# Patient Record
Sex: FEMALE | Race: WHITE | Employment: OTHER | URBAN - METROPOLITAN AREA
[De-identification: names, ages, dates, MRNs, and addresses within clinical notes are randomized per-mention and may not be internally consistent; named-entity substitution may affect disease eponyms.]

---

## 2019-10-15 ENCOUNTER — HOSPITAL ENCOUNTER (OUTPATIENT)
Age: 59
Setting detail: SPECIMEN
Discharge: HOME | End: 2019-10-15

## 2019-10-15 LAB
ABSOLUTE EOS #: 0.14 K/UL (ref 0–0.44)
ABSOLUTE IMMATURE GRANULOCYTE: <0.03 K/UL (ref 0–0.3)
ABSOLUTE LYMPH #: 1.37 K/UL (ref 1.1–3.7)
ABSOLUTE MONO #: 0.43 K/UL (ref 0.1–1.2)
BASOPHILS # BLD: 1 % (ref 0–2)
BASOPHILS ABSOLUTE: 0.03 K/UL (ref 0–0.2)
DIFFERENTIAL TYPE: ABNORMAL
EOSINOPHILS RELATIVE PERCENT: 2 % (ref 1–4)
HCT VFR BLD CALC: 45.3 % (ref 36.3–47.1)
HEMOGLOBIN: 14.6 G/DL (ref 11.9–15.1)
IMMATURE GRANULOCYTES: 0 %
LYMPHOCYTES # BLD: 22 % (ref 24–43)
MCH RBC QN AUTO: 30 PG (ref 25.2–33.5)
MCHC RBC AUTO-ENTMCNC: 32.2 G/DL (ref 28.4–34.8)
MCV RBC AUTO: 93 FL (ref 82.6–102.9)
MONOCYTES # BLD: 7 % (ref 3–12)
NRBC AUTOMATED: 0 PER 100 WBC
PDW BLD-RTO: 12.3 % (ref 11.8–14.4)
PLATELET # BLD: 262 K/UL (ref 138–453)
PLATELET ESTIMATE: ABNORMAL
PMV BLD AUTO: 12 FL (ref 8.1–13.5)
RBC # BLD: 4.87 M/UL (ref 3.95–5.11)
RBC # BLD: ABNORMAL 10*6/UL
SEG NEUTROPHILS: 68 % (ref 36–65)
SEGMENTED NEUTROPHILS ABSOLUTE COUNT: 4.22 K/UL (ref 1.5–8.1)
WBC # BLD: 6.2 K/UL (ref 3.5–11.3)
WBC # BLD: ABNORMAL 10*3/UL

## 2019-10-16 LAB
ALBUMIN SERPL-MCNC: 4.6 G/DL (ref 3.5–5.2)
ALBUMIN/GLOBULIN RATIO: 1.6 (ref 1–2.5)
ALP BLD-CCNC: 104 U/L (ref 35–104)
ALT SERPL-CCNC: 27 U/L (ref 5–33)
ANION GAP SERPL CALCULATED.3IONS-SCNC: 21 MMOL/L (ref 9–17)
AST SERPL-CCNC: 32 U/L
BILIRUB SERPL-MCNC: 0.47 MG/DL (ref 0.3–1.2)
BUN BLDV-MCNC: 13 MG/DL (ref 8–23)
BUN/CREAT BLD: ABNORMAL (ref 9–20)
CALCIUM SERPL-MCNC: 10 MG/DL (ref 8.6–10.4)
CHLORIDE BLD-SCNC: 101 MMOL/L (ref 98–107)
CHOLESTEROL/HDL RATIO: 2.8
CHOLESTEROL: 158 MG/DL
CO2: 23 MMOL/L (ref 20–31)
CREAT SERPL-MCNC: 0.92 MG/DL (ref 0.5–0.9)
GFR AFRICAN AMERICAN: >60 ML/MIN
GFR NON-AFRICAN AMERICAN: >60 ML/MIN
GFR SERPL CREATININE-BSD FRML MDRD: ABNORMAL ML/MIN/{1.73_M2}
GFR SERPL CREATININE-BSD FRML MDRD: ABNORMAL ML/MIN/{1.73_M2}
GLUCOSE BLD-MCNC: 103 MG/DL (ref 70–99)
HDLC SERPL-MCNC: 56 MG/DL
LDL CHOLESTEROL: 85 MG/DL (ref 0–130)
POTASSIUM SERPL-SCNC: 4.2 MMOL/L (ref 3.7–5.3)
SODIUM BLD-SCNC: 145 MMOL/L (ref 135–144)
TOTAL PROTEIN: 7.5 G/DL (ref 6.4–8.3)
TRIGL SERPL-MCNC: 86 MG/DL
TSH SERPL DL<=0.05 MIU/L-ACNC: 2.58 MIU/L (ref 0.3–5)
VLDLC SERPL CALC-MCNC: NORMAL MG/DL (ref 1–30)

## 2019-10-18 LAB
HPV SAMPLE: NORMAL
HPV, GENOTYPE 16: NOT DETECTED
HPV, GENOTYPE 18: NOT DETECTED
HPV, HIGH RISK OTHER: NOT DETECTED
HPV, INTERPRETATION: NORMAL
SPECIMEN DESCRIPTION: NORMAL

## 2019-10-21 LAB — CYTOLOGY REPORT: NORMAL

## 2019-12-10 ENCOUNTER — HOSPITAL ENCOUNTER (OUTPATIENT)
Dept: WOMENS IMAGING | Age: 59
Discharge: HOME | End: 2019-12-12
Payer: COMMERCIAL

## 2019-12-10 DIAGNOSIS — Z12.31 BREAST CANCER SCREENING BY MAMMOGRAM: ICD-10-CM

## 2019-12-10 PROCEDURE — 77067 SCR MAMMO BI INCL CAD: CPT

## 2020-02-10 ENCOUNTER — HOSPITAL ENCOUNTER (OUTPATIENT)
Dept: ULTRASOUND IMAGING | Age: 60
Discharge: HOME | End: 2020-02-12

## 2020-02-10 PROCEDURE — 76536 US EXAM OF HEAD AND NECK: CPT

## 2020-02-20 ENCOUNTER — HOSPITAL ENCOUNTER (OUTPATIENT)
Dept: CT IMAGING | Age: 60
Discharge: HOME | End: 2020-02-22

## 2020-02-20 ENCOUNTER — HOSPITAL ENCOUNTER (OUTPATIENT)
Dept: LAB | Age: 60
Discharge: HOME | End: 2020-02-20

## 2020-02-20 LAB
ABSOLUTE EOS #: 0.19 K/UL (ref 0–0.44)
ABSOLUTE IMMATURE GRANULOCYTE: <0.03 K/UL (ref 0–0.3)
ABSOLUTE LYMPH #: 1.55 K/UL (ref 1.1–3.7)
ABSOLUTE MONO #: 0.38 K/UL (ref 0.1–1.2)
ALBUMIN SERPL-MCNC: 4.2 G/DL (ref 3.5–5.2)
ALBUMIN/GLOBULIN RATIO: 1.6 (ref 1–2.5)
ALP BLD-CCNC: 96 U/L (ref 35–104)
ALT SERPL-CCNC: 22 U/L (ref 5–33)
ANION GAP SERPL CALCULATED.3IONS-SCNC: 9 MMOL/L (ref 9–17)
AST SERPL-CCNC: 24 U/L
BASOPHILS # BLD: 1 % (ref 0–2)
BASOPHILS ABSOLUTE: 0.03 K/UL (ref 0–0.2)
BILIRUB SERPL-MCNC: 0.18 MG/DL (ref 0.3–1.2)
BUN BLDV-MCNC: 14 MG/DL (ref 8–23)
BUN/CREAT BLD: 17 (ref 9–20)
CALCIUM SERPL-MCNC: 9.5 MG/DL (ref 8.6–10.4)
CHLORIDE BLD-SCNC: 95 MMOL/L (ref 98–107)
CO2: 29 MMOL/L (ref 20–31)
CREAT SERPL-MCNC: 0.83 MG/DL (ref 0.5–0.9)
DIFFERENTIAL TYPE: NORMAL
EOSINOPHILS RELATIVE PERCENT: 3 % (ref 1–4)
GFR AFRICAN AMERICAN: >60 ML/MIN
GFR NON-AFRICAN AMERICAN: >60 ML/MIN
GFR SERPL CREATININE-BSD FRML MDRD: ABNORMAL ML/MIN/{1.73_M2}
GFR SERPL CREATININE-BSD FRML MDRD: ABNORMAL ML/MIN/{1.73_M2}
GLUCOSE BLD-MCNC: 105 MG/DL (ref 70–99)
HCT VFR BLD CALC: 43.8 % (ref 36.3–47.1)
HEMOGLOBIN: 13.8 G/DL (ref 11.9–15.1)
IMMATURE GRANULOCYTES: 0 %
LYMPHOCYTES # BLD: 28 % (ref 24–43)
MCH RBC QN AUTO: 29.5 PG (ref 25.2–33.5)
MCHC RBC AUTO-ENTMCNC: 31.5 G/DL (ref 28.4–34.8)
MCV RBC AUTO: 93.6 FL (ref 82.6–102.9)
MONOCYTES # BLD: 7 % (ref 3–12)
NRBC AUTOMATED: 0 PER 100 WBC
PDW BLD-RTO: 11.9 % (ref 11.8–14.4)
PLATELET # BLD: 243 K/UL (ref 138–453)
PLATELET ESTIMATE: NORMAL
PMV BLD AUTO: 11.3 FL (ref 8.1–13.5)
POTASSIUM SERPL-SCNC: 4.3 MMOL/L (ref 3.7–5.3)
RBC # BLD: 4.68 M/UL (ref 3.95–5.11)
RBC # BLD: NORMAL 10*6/UL
SEG NEUTROPHILS: 61 % (ref 36–65)
SEGMENTED NEUTROPHILS ABSOLUTE COUNT: 3.45 K/UL (ref 1.5–8.1)
SODIUM BLD-SCNC: 133 MMOL/L (ref 135–144)
TOTAL PROTEIN: 6.8 G/DL (ref 6.4–8.3)
TSH SERPL DL<=0.05 MIU/L-ACNC: 2.06 MIU/L (ref 0.3–5)
WBC # BLD: 5.6 K/UL (ref 3.5–11.3)
WBC # BLD: NORMAL 10*3/UL

## 2020-02-20 PROCEDURE — 36415 COLL VENOUS BLD VENIPUNCTURE: CPT

## 2020-02-20 PROCEDURE — 84443 ASSAY THYROID STIM HORMONE: CPT

## 2020-02-20 PROCEDURE — 85025 COMPLETE CBC W/AUTO DIFF WBC: CPT

## 2020-02-20 PROCEDURE — 70491 CT SOFT TISSUE NECK W/DYE: CPT

## 2020-02-20 PROCEDURE — 6360000004 HC RX CONTRAST MEDICATION: Performed by: NURSE PRACTITIONER

## 2020-02-20 PROCEDURE — 80053 COMPREHEN METABOLIC PANEL: CPT

## 2020-02-20 RX ADMIN — IOPAMIDOL 75 ML: 755 INJECTION, SOLUTION INTRAVENOUS at 11:16

## 2020-03-09 ENCOUNTER — HOSPITAL ENCOUNTER (OUTPATIENT)
Dept: CT IMAGING | Age: 60
Discharge: HOME | End: 2020-03-11

## 2020-03-09 PROCEDURE — 71275 CT ANGIOGRAPHY CHEST: CPT

## 2020-03-09 PROCEDURE — 6360000004 HC RX CONTRAST MEDICATION: Performed by: NURSE PRACTITIONER

## 2020-03-09 RX ADMIN — IOPAMIDOL 75 ML: 755 INJECTION, SOLUTION INTRAVENOUS at 14:21

## 2020-08-27 ENCOUNTER — OFFICE VISIT (OUTPATIENT)
Dept: ONCOLOGY | Age: 60
End: 2020-08-27

## 2020-08-27 VITALS
DIASTOLIC BLOOD PRESSURE: 73 MMHG | TEMPERATURE: 98.5 F | SYSTOLIC BLOOD PRESSURE: 120 MMHG | BODY MASS INDEX: 36.02 KG/M2 | RESPIRATION RATE: 18 BRPM | HEART RATE: 82 BPM | WEIGHT: 211 LBS | HEIGHT: 64 IN

## 2020-08-27 PROBLEM — D49.0 PAROTID TUMOR: Status: ACTIVE | Noted: 2020-08-27

## 2020-08-27 PROBLEM — R91.8 LUNG MASS: Status: ACTIVE | Noted: 2020-08-27

## 2020-08-27 PROCEDURE — 99245 OFF/OP CONSLTJ NEW/EST HI 55: CPT | Performed by: INTERNAL MEDICINE

## 2020-08-27 RX ORDER — METOPROLOL TARTRATE 50 MG/1
50 TABLET, FILM COATED ORAL 2 TIMES DAILY
COMMUNITY

## 2020-08-27 RX ORDER — ELECTROLYTES/DEXTROSE
SOLUTION, ORAL ORAL
COMMUNITY
Start: 2019-07-11

## 2020-08-27 RX ORDER — ANTIOX #8/OM3/DHA/EPA/LUT/ZEAX 250-2.5 MG
CAPSULE ORAL
COMMUNITY
Start: 2019-07-11 | End: 2020-08-27 | Stop reason: SDUPTHER

## 2020-08-27 RX ORDER — NYSTATIN 100000 U/G
OINTMENT TOPICAL
COMMUNITY
Start: 2020-08-13

## 2020-08-27 RX ORDER — ANTIOX #8/OM3/DHA/EPA/LUT/ZEAX 250-2.5 MG
CAPSULE ORAL
COMMUNITY
Start: 2019-07-11

## 2020-08-27 RX ORDER — LISINOPRIL AND HYDROCHLOROTHIAZIDE 12.5; 1 MG/1; MG/1
1 TABLET ORAL DAILY
COMMUNITY

## 2020-08-27 RX ORDER — BUPROPION HYDROCHLORIDE 150 MG/1
150 TABLET, EXTENDED RELEASE ORAL 3 TIMES DAILY
COMMUNITY
Start: 2019-07-11

## 2020-08-27 RX ORDER — NAPROXEN 500 MG/1
TABLET ORAL
COMMUNITY
Start: 2019-07-11

## 2020-08-27 RX ORDER — SIMVASTATIN 20 MG
20 TABLET ORAL NIGHTLY
COMMUNITY

## 2020-08-27 NOTE — PROGRESS NOTES
_               Ms. Ericka Ruiz is a very pleasant 63 y.o. female with history of multiple co morbidities as listed. The patient is referred for evaluation of right upper lobe lung mass and parotid gland tumor. Patient had feeling of enlarged bilateral submandibular areas for 1 to 2 years. She started noticing some painful areas about 8 months ago. For evaluation of the submandibular symptoms patient had soft tissue neck CT scan. Mirna Staggers She was noted to have slightly enlarged parotids but also incidentally discovered right upper lobe oval mass. CT scan of the chest March 9 showed same findings. Patient was referred for further evaluation however due to coronavirus pandemic she was not seen until today. Patient has no chest pain. No cough, sputum or hemoptysis. No weight loss or decreased appetite. No fever or night sweats. No enlarged lymph nodes. Patient smokes half pack per day for the last 40 years. Denies alcohol drinking. PAST MEDICAL HISTORY: has a past medical history of ADHD (attention deficit hyperactivity disorder), Anxiety, Back pain, Chronic dental pain, and Hypertension. PAST SURGICAL HISTORY: has a past surgical history that includes Tonsillectomy; Toe Surgery; Dental surgery; Tubal ligation; Rotator cuff repair; and Breast lumpectomy. CURRENT MEDICATIONS:  has a current medication list which includes the following prescription(s): medical marijuana, naproxen, multivitamin adult, preservision areds 2, bupropion, nystatin, simvastatin, metoprolol tartrate, lisinopril-hydrochlorothiazide, and cetirizine hcl. ALLERGIES:  is allergic to adhesive tape; cat hair extract; dust mite extract; molds & smuts; and trazodone. FAMILY HISTORY: Brother had lung cancer. Sister had breast cancer. Otherwise negative for any hematological or oncological conditions.      SOCIAL HISTORY:  reports that she has been smoking cigarettes. She has been smoking about 0.25 packs per day. She does not have any smokeless tobacco history on file. She reports that she does not drink alcohol or use drugs. REVIEW OF SYSTEMS:     · General: No weakness or fatigue. No unanticipated weight loss or decreased appetite. No fever or chills. · Eyes: No blurred vision, eye pain or double vision. · Ears: No hearing problems or drainage. No tinnitus. · Throat: No sore throat, problems with swallowing or dysphagia. · Respiratory: No cough, sputum or hemoptysis. No shortness of breath. No pleuritic chest pain. · Cardiovascular: No chest pain, orthopnea or PND. No lower extremity edema. No palpitation. · Gastrointestinal: No problems with swallowing. No abdominal pain or bloating. No nausea or vomiting. No diarrhea or constipation. No GI bleeding. · Genitourinary: No dysuria, hematuria, frequency or urgency. · Musculoskeletal: No muscle aches or pains. No limitation of movement. No back pain. No gait disturbance, No joint complaints. · Dermatologic: No skin rashes or pruritus. No skin lesions or discolorations. · Psychiatric: No depression, anxiety, or stress or signs of schizophrenia. No change in mood or affect. · Hematologic: No history of bleeding tendency. No bruises or ecchymosis. No history of clotting problems. · Infectious disease: No fever, chills or frequent infections. · Endocrine: No polydipsia or polyuria. No temperature intolerance. · Neurologic: No headaches or dizziness. No weakness or numbness of the extremities. No changes in balance, coordination,  memory, mentation, behavior. · Allergic/Immunologic: No nasal congestion or hives. No repeated infections. PHYSICAL EXAM:  The patient is not in acute distress. Vital signs: Blood pressure 120/73, pulse 82, temperature 98.5 °F (36.9 °C), temperature source Temporal, resp. rate 18, height 5' 4\" (1.626 m), weight 211 lb (95.7 kg).      General

## 2020-09-09 ENCOUNTER — HOSPITAL ENCOUNTER (OUTPATIENT)
Dept: CT IMAGING | Age: 60
Discharge: HOME | End: 2020-09-11

## 2020-09-09 ENCOUNTER — HOSPITAL ENCOUNTER (OUTPATIENT)
Dept: LAB | Age: 60
Discharge: HOME | End: 2020-09-09

## 2020-09-09 LAB
BUN BLDV-MCNC: 12 MG/DL (ref 8–23)
CREAT SERPL-MCNC: 0.9 MG/DL (ref 0.5–0.9)
GFR AFRICAN AMERICAN: >60 ML/MIN
GFR NON-AFRICAN AMERICAN: >60 ML/MIN
GFR SERPL CREATININE-BSD FRML MDRD: NORMAL ML/MIN/{1.73_M2}
GFR SERPL CREATININE-BSD FRML MDRD: NORMAL ML/MIN/{1.73_M2}

## 2020-09-09 PROCEDURE — 82565 ASSAY OF CREATININE: CPT

## 2020-09-09 PROCEDURE — 70491 CT SOFT TISSUE NECK W/DYE: CPT

## 2020-09-09 PROCEDURE — 84520 ASSAY OF UREA NITROGEN: CPT

## 2020-09-09 PROCEDURE — 6360000004 HC RX CONTRAST MEDICATION: Performed by: INTERNAL MEDICINE

## 2020-09-09 PROCEDURE — 71260 CT THORAX DX C+: CPT

## 2020-09-09 PROCEDURE — 36415 COLL VENOUS BLD VENIPUNCTURE: CPT

## 2020-09-09 RX ADMIN — IOPAMIDOL 75 ML: 755 INJECTION, SOLUTION INTRAVENOUS at 14:00

## 2020-09-09 RX ADMIN — IOPAMIDOL 75 ML: 755 INJECTION, SOLUTION INTRAVENOUS at 14:06

## 2020-09-16 ENCOUNTER — OFFICE VISIT (OUTPATIENT)
Dept: ONCOLOGY | Age: 60
End: 2020-09-16

## 2020-09-16 VITALS
SYSTOLIC BLOOD PRESSURE: 114 MMHG | DIASTOLIC BLOOD PRESSURE: 73 MMHG | RESPIRATION RATE: 18 BRPM | HEART RATE: 81 BPM | TEMPERATURE: 97 F | BODY MASS INDEX: 36.19 KG/M2 | HEIGHT: 64 IN | WEIGHT: 212 LBS

## 2020-09-16 PROCEDURE — 99211 OFF/OP EST MAY X REQ PHY/QHP: CPT | Performed by: INTERNAL MEDICINE

## 2020-09-16 PROCEDURE — 99214 OFFICE O/P EST MOD 30 MIN: CPT | Performed by: INTERNAL MEDICINE

## 2020-09-24 NOTE — PROGRESS NOTES
_     Chief Complaint   Patient presents with    Follow-up     lung mass scan results     DIAGNOSIS:       Right upper lobe lung mass  Bilateral parotid glands enlargement  Tobacco abuse      CURRENT THERAPY:         Work-up in progress    BRIEF CASE HISTORY:      Ms. Fabrizio Haynes is a very pleasant 63 y.o. female with history of multiple co morbidities as listed. The patient is referred for evaluation of right upper lobe lung mass and parotid gland and possible lung tumor. Patient had feeling of enlarged bilateral submandibular areas for 1 to 2 years. She started noticing some painful areas about 8 months ago. For evaluation of the submandibular symptoms patient had soft tissue neck CT scan. Mere Betty She was noted to have slightly enlarged parotids but also incidentally discovered right upper lobe oval mass. CT scan of the chest March 9 showed same findings. Patient was referred for further evaluation however due to coronavirus pandemic she was not seen until today. Patient has no chest pain. No cough, sputum or hemoptysis. No weight loss or decreased appetite. No fever or night sweats. No enlarged lymph nodes. Patient smokes half pack per day for the last 40 years. Denies alcohol drinking. INTERIM HISTORY:   Patient is seen for follow-up enlarged submandibular glands and lung mass. Patient had CT scan of the chest and CT scan of the soft tissue neck and she is seen for further management. Patient is clinically stable. No chest pain. No shortness of breath. No fever. No new events. PAST MEDICAL HISTORY: has a past medical history of ADHD (attention deficit hyperactivity disorder), Anxiety, Back pain, Chronic dental pain, and Hypertension. PAST SURGICAL HISTORY: has a past surgical history that includes Tonsillectomy; Toe Surgery; Dental surgery; Tubal ligation; Rotator cuff repair; and Breast lumpectomy. chills or frequent infections. · Endocrine: No polydipsia or polyuria. No temperature intolerance. · Neurologic: No headaches or dizziness. No weakness or numbness of the extremities. No changes in balance, coordination,  memory, mentation, behavior. · Allergic/Immunologic: No nasal congestion or hives. No repeated infections. PHYSICAL EXAM:  The patient is not in acute distress. Vital signs: Blood pressure 114/73, pulse 81, temperature 97 °F (36.1 °C), temperature source Temporal, resp. rate 18, height 5' 4\" (1.626 m), weight 212 lb (96.2 kg). General appearance - well appearing, not in pain or distress  Mental status - good mood, alert and oriented  Eyes - pupils equal and reactive, extraocular eye movements intact  Ears - bilateral TM's and external ear canals normal  Nose - normal and patent, no erythema, discharge or polyps  Mouth - mucous membranes moist, pharynx normal without lesions  Neck - supple, no significant adenopathy. Slightly enlarged bilateral parotids.   Lymphatics - no palpable lymphadenopathy, no hepatosplenomegaly  Chest - clear to auscultation, no wheezes, rales or rhonchi, symmetric air entry  Heart - normal rate, regular rhythm, normal S1, S2, no murmurs, rubs, clicks or gallops  Abdomen - soft, nontender, nondistended, no masses or organomegaly  Neurological - alert, oriented, normal speech, no focal findings or movement disorder noted  Musculoskeletal - no joint tenderness, deformity or swelling  Extremities - peripheral pulses normal, no pedal edema, no clubbing or cyanosis  Skin - normal coloration and turgor, no rashes, no suspicious skin lesions noted     Review of Diagnostic data:   Lab Results   Component Value Date    WBC 5.6 02/20/2020    HGB 13.8 02/20/2020    HCT 43.8 02/20/2020    MCV 93.6 02/20/2020     02/20/2020       Chemistry        Component Value Date/Time     (L) 02/20/2020 0950    K 4.3 02/20/2020 0950    CL 95 (L) 02/20/2020 0950    CO2 29 02/20/2020 0950    BUN 12 09/09/2020 1243    CREATININE 0.90 09/09/2020 1243        Component Value Date/Time    CALCIUM 9.5 02/20/2020 0950    ALKPHOS 96 02/20/2020 0950    AST 24 02/20/2020 0950    ALT 22 02/20/2020 0950    BILITOT 0.18 (L) 02/20/2020 0950        CT CHEST W CONTRAST  Narrative: EXAMINATION:  CT OF THE CHEST WITH CONTRAST 9/9/2020 2:01 pm    TECHNIQUE:  CT of the chest was performed with the administration of intravenous  contrast. Multiplanar reformatted images are provided for review. Dose  modulation, iterative reconstruction, and/or weight based adjustment of the  mA/kV was utilized to reduce the radiation dose to as low as reasonably  achievable. COMPARISON:  CT scan of the chest from 03/09/2020    HISTORY:  ORDERING SYSTEM PROVIDED HISTORY: Pleural based lung mass. Parotid tumor    FINDINGS:  Mediastinum: Cardiac chambers unchanged in size and configuration and WNL;  slightly more prominent right atrial appendage. Borderline dilatation  anterior thoracic aorta, reaching 39 mm. No acute abnormality thoracic  aorta. No pathologic adenopathy. Thyroid WNL. Lungs/pleura: Unchanged 2.4 x 3.4 cm well-circumscribed ovoid mass abutting  posteromedial pleura T5 level. No widening right T5 transverse foramen. No  acute pulmonary or pleural abnormality; mild-moderate mid upper lung  emphysematous changes and multiple bilateral scattered calcified densities  again seen. No consolidation, pleural effusion, pneumothorax or definite new  nodule identified. Upper Abdomen: No acute abnormality visualized structures upper abdomen. Soft Tissues/Bones: No acute bony or soft tissue abnormality. Impression: Stable pleural based mass adjacent to the right T2 vertebral body. Differential includes neurogenic and other benign tumors (e.g. pleural  fibroma)    No acute CT finding in the chest.  No lymphadenopathy.     Multiple pulmonary calcifications, likely secondary to remote granulomatous,  viral or fungal disease. Additional stable findings, as above. CT SOFT TISSUE NECK W CONTRAST  Narrative: EXAMINATION:  CT OF THE NECK SOFT TISSUE WITH CONTRAST  9/9/2020    TECHNIQUE:  CT of the neck was performed with the administration of intravenous contrast.  Multiplanar reformatted images are provided for review. Dose modulation,  iterative reconstruction, and/or weight based adjustment of the mA/kV was  utilized to reduce the radiation dose to as low as reasonably achievable. COMPARISON:  02/20/2020    HISTORY:  ORDERING SYSTEM PROVIDED HISTORY: Parotid tumor    FINDINGS:  PHARYNX/LARYNX:  The palatine tonsils are normal in appearance. The tongue  is normal in appearance. The valleculae, epiglottis, aryepiglottic folds and  pyriform sinuses appear unremarkable. The true and false vocal cords are  normal in appearance. No mass or abscess is seen. SALIVARY GLANDS/THYROID:  Mildly heterogeneous well-circumscribed solid  lesion in the superficial lobe of the right parotid gland is unchanged  measuring 2 x 1.4 cm in transaxial dimensions. Cystic and solid  heterogeneously enhancing mass in the superficial of the left parotid gland  is also unchanged measuring 2 x 1.4 cm. The submandibular glands are  unremarkable. Dystrophic 4 mm calcification noted in the inferior left  thyroid gland, unchanged. No suspect thyroid nodule. LYMPH NODES:  There is no pathologically enlarged or morphologically abnormal  lymph node. SOFT TISSUES:  No fluid collection, edema, or acute vascular abnormality. Mild atherosclerotic plaque at the right carotid bifurcation. BRAIN/ORBITS/SINUSES:  The visualized portion of the intracranial contents  appear unremarkable. The visualized portion of the orbits, paranasal sinuses  and mastoid air cells demonstrate no acute abnormality.     LUNG APICES/SUPERIOR MEDIASTINUM:  3.4 x 2.4 cm homogeneous  well-circumscribed soft tissue mass in the posteromedial extrapleural space  adjacent to the T2 vertebral body is unchanged. See separate chest CT for  full details. Emphysema is noted. BONES:  No acute osseous abnormality or suspect osseous lesion is seen. There are mild degenerative changes in the cervical spine. Impression: 1. Stable bilateral intraparotid masses most consistent with benign mixed  neoplasms or Warthin's tumors. Malignancy cannot be completely excluded on  the basis of imaging alone but is thought less likely. 2. Stable benign-appearing extrapleural mass in the right thoracic cavity  adjacent to the T2 vertebral body with differential diagnosis as previously  discussed. See separate chest CT for full details. IMPRESSION:   Right upper lobe lung mass  Bilateral parotid glands enlargement  Tobacco abuse    PLAN: I reviewed the images from February and March 2020 and explained to the patient and her . Repeated CT scans from September 9, 2020 were reviewed and discussed with the patient and family. Findings are generally benign with no significant suspicious abnormalities. Findings are likely benign. At the present time recommendations for observation. We will see her in 6 months for clinical evaluation. Further recommendations will be based on her overall status. Counseled about importance of tobacco cessation. Patient's questions were answered to the best of her satisfaction and she verbalized full understanding and agreement.

## 2021-03-31 ENCOUNTER — OFFICE VISIT (OUTPATIENT)
Dept: ONCOLOGY | Age: 61
End: 2021-03-31
Payer: MEDICARE

## 2021-03-31 VITALS
BODY MASS INDEX: 35.32 KG/M2 | SYSTOLIC BLOOD PRESSURE: 117 MMHG | WEIGHT: 212 LBS | HEIGHT: 65 IN | RESPIRATION RATE: 18 BRPM | DIASTOLIC BLOOD PRESSURE: 71 MMHG | HEART RATE: 77 BPM

## 2021-03-31 DIAGNOSIS — D49.0 PAROTID TUMOR: ICD-10-CM

## 2021-03-31 DIAGNOSIS — R91.8 LUNG MASS: Primary | ICD-10-CM

## 2021-03-31 PROCEDURE — G8427 DOCREV CUR MEDS BY ELIG CLIN: HCPCS | Performed by: INTERNAL MEDICINE

## 2021-03-31 PROCEDURE — G8417 CALC BMI ABV UP PARAM F/U: HCPCS | Performed by: INTERNAL MEDICINE

## 2021-03-31 PROCEDURE — 3017F COLORECTAL CA SCREEN DOC REV: CPT | Performed by: INTERNAL MEDICINE

## 2021-03-31 PROCEDURE — 99214 OFFICE O/P EST MOD 30 MIN: CPT | Performed by: INTERNAL MEDICINE

## 2021-03-31 PROCEDURE — 4004F PT TOBACCO SCREEN RCVD TLK: CPT | Performed by: INTERNAL MEDICINE

## 2021-03-31 PROCEDURE — G8484 FLU IMMUNIZE NO ADMIN: HCPCS | Performed by: INTERNAL MEDICINE

## 2021-03-31 PROCEDURE — 99211 OFF/OP EST MAY X REQ PHY/QHP: CPT

## 2021-03-31 RX ORDER — ALBUTEROL SULFATE 1.25 MG/3ML
1 SOLUTION RESPIRATORY (INHALATION) EVERY 6 HOURS PRN
COMMUNITY

## 2021-03-31 NOTE — PROGRESS NOTES
_     Chief Complaint   Patient presents with    Follow-up     Lung mass. DIAGNOSIS:       Right upper lobe lung mass  Bilateral parotid glands enlargement  Tobacco abuse      CURRENT THERAPY:         Observation and active surveillance. BRIEF CASE HISTORY:      Ms. Anton Hirsch is a very pleasant 57 y.o. female with history of multiple co morbidities as listed. The patient is referred for evaluation of right upper lobe lung mass and parotid gland and possible lung tumor. Patient had feeling of enlarged bilateral submandibular areas for 1 to 2 years. She started noticing some painful areas about 8 months ago. For evaluation of the submandibular symptoms patient had soft tissue neck CT scan. Olive Love She was noted to have slightly enlarged parotids but also incidentally discovered right upper lobe oval mass. CT scan of the chest March 9 showed same findings. Patient was referred for further evaluation however due to coronavirus pandemic she was not seen until today. Patient has no chest pain. No cough, sputum or hemoptysis. No weight loss or decreased appetite. No fever or night sweats. No enlarged lymph nodes. Patient smokes half pack per day for the last 40 years. Denies alcohol drinking. INTERIM HISTORY:   Patient is seen for follow-up enlarged submandibular glands and lung mass. Patient had CT scan of the chest and CT scan of the soft tissue neck which were suggestive of benign findings. Patient continues to smoke. She smokes about 8 to 10 cigarettes a day. Counseled about importance of tobacco cessation. Patient has occasional cough. No chest pain. No hemoptysis. She continues to feel the lumps in the parotid areas. No difficulty swallowing. No other lumps or masses.         PAST MEDICAL HISTORY: has a past medical history of ADHD (attention deficit hyperactivity disorder), Anxiety, Back pain, Chronic dental pain, and Hypertension. PAST SURGICAL HISTORY: has a past surgical history that includes Tonsillectomy; Toe Surgery; Dental surgery; Tubal ligation; Rotator cuff repair; and Breast lumpectomy. CURRENT MEDICATIONS:  has a current medication list which includes the following prescription(s): albuterol, medical marijuana, naproxen, multivitamin adult, preservision areds 2, bupropion, nystatin, simvastatin, metoprolol tartrate, lisinopril-hydrochlorothiazide, and cetirizine hcl. ALLERGIES:  is allergic to adhesive tape; cat hair extract; dust mite extract; molds & smuts; and trazodone. FAMILY HISTORY: Brother had lung cancer. Sister had breast cancer. Otherwise negative for any hematological or oncological conditions. SOCIAL HISTORY:  reports that she has been smoking cigarettes. She has been smoking about 0.25 packs per day. She does not have any smokeless tobacco history on file. She reports that she does not drink alcohol or use drugs. REVIEW OF SYSTEMS:     · General: No weakness or fatigue. No unanticipated weight loss or decreased appetite. No fever or chills. · Eyes: No blurred vision, eye pain or double vision. · Ears: No hearing problems or drainage. No tinnitus. · Throat: No sore throat, problems with swallowing or dysphagia. · Respiratory: No cough, sputum or hemoptysis. No shortness of breath. No pleuritic chest pain. · Cardiovascular: No chest pain, orthopnea or PND. No lower extremity edema. No palpitation. · Gastrointestinal: No problems with swallowing. No abdominal pain or bloating. No nausea or vomiting. No diarrhea or constipation. No GI bleeding. · Genitourinary: No dysuria, hematuria, frequency or urgency. · Musculoskeletal: No muscle aches or pains. No limitation of movement. No back pain. No gait disturbance, No joint complaints. · Dermatologic: No skin rashes or pruritus. No skin lesions or discolorations.    · Psychiatric: No depression, anxiety, or stress or signs of schizophrenia. No change in mood or affect. · Hematologic: No history of bleeding tendency. No bruises or ecchymosis. No history of clotting problems. · Infectious disease: No fever, chills or frequent infections. · Endocrine: No polydipsia or polyuria. No temperature intolerance. · Neurologic: No headaches or dizziness. No weakness or numbness of the extremities. No changes in balance, coordination,  memory, mentation, behavior. · Allergic/Immunologic: No nasal congestion or hives. No repeated infections. PHYSICAL EXAM:  The patient is not in acute distress. Vital signs: Blood pressure 117/71, pulse 77, resp. rate 18, height 5' 4.5\" (1.638 m), weight 212 lb (96.2 kg). General appearance - well appearing, not in pain or distress  Mental status - good mood, alert and oriented  Eyes - pupils equal and reactive, extraocular eye movements intact  Ears - bilateral TM's and external ear canals normal  Nose - normal and patent, no erythema, discharge or polyps  Mouth - mucous membranes moist, pharynx normal without lesions  Neck - supple, no significant adenopathy. Slightly enlarged bilateral parotids.   Lymphatics - no palpable lymphadenopathy, no hepatosplenomegaly  Chest - clear to auscultation, no wheezes, rales or rhonchi, symmetric air entry  Heart - normal rate, regular rhythm, normal S1, S2, no murmurs, rubs, clicks or gallops  Abdomen - soft, nontender, nondistended, no masses or organomegaly  Neurological - alert, oriented, normal speech, no focal findings or movement disorder noted  Musculoskeletal - no joint tenderness, deformity or swelling  Extremities - peripheral pulses normal, no pedal edema, no clubbing or cyanosis  Skin - normal coloration and turgor, no rashes, no suspicious skin lesions noted     Review of Diagnostic data:   Lab Results   Component Value Date    WBC 5.6 02/20/2020    HGB 13.8 02/20/2020    HCT 43.8 02/20/2020    MCV 93.6 02/20/2020  02/20/2020       Chemistry        Component Value Date/Time     (L) 02/20/2020 0950    K 4.3 02/20/2020 0950    CL 95 (L) 02/20/2020 0950    CO2 29 02/20/2020 0950    BUN 12 09/09/2020 1243    CREATININE 0.90 09/09/2020 1243        Component Value Date/Time    CALCIUM 9.5 02/20/2020 0950    ALKPHOS 96 02/20/2020 0950    AST 24 02/20/2020 0950    ALT 22 02/20/2020 0950    BILITOT 0.18 (L) 02/20/2020 0950        CT CHEST W CONTRAST  Narrative: EXAMINATION:  CT OF THE CHEST WITH CONTRAST 9/9/2020 2:01 pm    TECHNIQUE:  CT of the chest was performed with the administration of intravenous  contrast. Multiplanar reformatted images are provided for review. Dose  modulation, iterative reconstruction, and/or weight based adjustment of the  mA/kV was utilized to reduce the radiation dose to as low as reasonably  achievable. COMPARISON:  CT scan of the chest from 03/09/2020    HISTORY:  ORDERING SYSTEM PROVIDED HISTORY: Pleural based lung mass. Parotid tumor    FINDINGS:  Mediastinum: Cardiac chambers unchanged in size and configuration and WNL;  slightly more prominent right atrial appendage. Borderline dilatation  anterior thoracic aorta, reaching 39 mm. No acute abnormality thoracic  aorta. No pathologic adenopathy. Thyroid WNL. Lungs/pleura: Unchanged 2.4 x 3.4 cm well-circumscribed ovoid mass abutting  posteromedial pleura T5 level. No widening right T5 transverse foramen. No  acute pulmonary or pleural abnormality; mild-moderate mid upper lung  emphysematous changes and multiple bilateral scattered calcified densities  again seen. No consolidation, pleural effusion, pneumothorax or definite new  nodule identified. Upper Abdomen: No acute abnormality visualized structures upper abdomen. Soft Tissues/Bones: No acute bony or soft tissue abnormality. Impression: Stable pleural based mass adjacent to the right T2 vertebral body.   Differential includes neurogenic and other benign tumors (e.g. pleural  fibroma)    No acute CT finding in the chest.  No lymphadenopathy. Multiple pulmonary calcifications, likely secondary to remote granulomatous,  viral or fungal disease. Additional stable findings, as above. CT SOFT TISSUE NECK W CONTRAST  Narrative: EXAMINATION:  CT OF THE NECK SOFT TISSUE WITH CONTRAST  9/9/2020    TECHNIQUE:  CT of the neck was performed with the administration of intravenous contrast.  Multiplanar reformatted images are provided for review. Dose modulation,  iterative reconstruction, and/or weight based adjustment of the mA/kV was  utilized to reduce the radiation dose to as low as reasonably achievable. COMPARISON:  02/20/2020    HISTORY:  ORDERING SYSTEM PROVIDED HISTORY: Parotid tumor    FINDINGS:  PHARYNX/LARYNX:  The palatine tonsils are normal in appearance. The tongue  is normal in appearance. The valleculae, epiglottis, aryepiglottic folds and  pyriform sinuses appear unremarkable. The true and false vocal cords are  normal in appearance. No mass or abscess is seen. SALIVARY GLANDS/THYROID:  Mildly heterogeneous well-circumscribed solid  lesion in the superficial lobe of the right parotid gland is unchanged  measuring 2 x 1.4 cm in transaxial dimensions. Cystic and solid  heterogeneously enhancing mass in the superficial of the left parotid gland  is also unchanged measuring 2 x 1.4 cm. The submandibular glands are  unremarkable. Dystrophic 4 mm calcification noted in the inferior left  thyroid gland, unchanged. No suspect thyroid nodule. LYMPH NODES:  There is no pathologically enlarged or morphologically abnormal  lymph node. SOFT TISSUES:  No fluid collection, edema, or acute vascular abnormality. Mild atherosclerotic plaque at the right carotid bifurcation. BRAIN/ORBITS/SINUSES:  The visualized portion of the intracranial contents  appear unremarkable.   The visualized portion of the orbits, paranasal sinuses  and mastoid air cells demonstrate

## 2021-05-05 ENCOUNTER — HOSPITAL ENCOUNTER (OUTPATIENT)
Dept: WOMENS IMAGING | Age: 61
Discharge: HOME | End: 2021-05-07
Payer: MEDICARE

## 2021-05-05 DIAGNOSIS — Z12.31 ENCOUNTER FOR SCREENING MAMMOGRAM FOR MALIGNANT NEOPLASM OF BREAST: ICD-10-CM

## 2021-05-05 PROCEDURE — 77063 BREAST TOMOSYNTHESIS BI: CPT

## 2021-05-14 ENCOUNTER — HOSPITAL ENCOUNTER (INPATIENT)
Age: 61
LOS: 2 days | Discharge: HOME | DRG: 494 | End: 2021-05-16
Attending: INTERNAL MEDICINE | Admitting: INTERNAL MEDICINE
Payer: MEDICARE

## 2021-05-14 ENCOUNTER — APPOINTMENT (OUTPATIENT)
Dept: CT IMAGING | Age: 61
DRG: 494 | End: 2021-05-14
Attending: INTERNAL MEDICINE
Payer: MEDICARE

## 2021-05-14 ENCOUNTER — TELEPHONE (OUTPATIENT)
Dept: FAMILY MEDICINE CLINIC | Age: 61
End: 2021-05-14

## 2021-05-14 ENCOUNTER — APPOINTMENT (OUTPATIENT)
Dept: GENERAL RADIOLOGY | Age: 61
DRG: 494 | End: 2021-05-14
Attending: INTERNAL MEDICINE
Payer: MEDICARE

## 2021-05-14 DIAGNOSIS — S82.852A TRIMALLEOLAR FRACTURE OF LEFT ANKLE, CLOSED, INITIAL ENCOUNTER: Primary | ICD-10-CM

## 2021-05-14 LAB
ABSOLUTE EOS #: <0.03 K/UL (ref 0–0.44)
ABSOLUTE IMMATURE GRANULOCYTE: 0.05 K/UL (ref 0–0.3)
ABSOLUTE LYMPH #: 0.7 K/UL (ref 1.1–3.7)
ABSOLUTE MONO #: 0.5 K/UL (ref 0.1–1.2)
ANION GAP SERPL CALCULATED.3IONS-SCNC: 11 MMOL/L (ref 9–17)
BASOPHILS # BLD: 0 % (ref 0–2)
BASOPHILS ABSOLUTE: <0.03 K/UL (ref 0–0.2)
BUN BLDV-MCNC: 18 MG/DL (ref 8–23)
BUN/CREAT BLD: 15 (ref 9–20)
CALCIUM SERPL-MCNC: 9.7 MG/DL (ref 8.6–10.4)
CHLORIDE BLD-SCNC: 97 MMOL/L (ref 98–107)
CO2: 28 MMOL/L (ref 20–31)
CREAT SERPL-MCNC: 1.2 MG/DL (ref 0.5–0.9)
DIFFERENTIAL TYPE: ABNORMAL
EOSINOPHILS RELATIVE PERCENT: 0 % (ref 1–4)
GFR AFRICAN AMERICAN: 55 ML/MIN
GFR NON-AFRICAN AMERICAN: 46 ML/MIN
GFR SERPL CREATININE-BSD FRML MDRD: ABNORMAL ML/MIN/{1.73_M2}
GFR SERPL CREATININE-BSD FRML MDRD: ABNORMAL ML/MIN/{1.73_M2}
GLUCOSE BLD-MCNC: 136 MG/DL (ref 70–99)
HCT VFR BLD CALC: 42 % (ref 36.3–47.1)
HEMOGLOBIN: 13.8 G/DL (ref 11.9–15.1)
IMMATURE GRANULOCYTES: 0 %
INR BLD: 1
LYMPHOCYTES # BLD: 5 % (ref 24–43)
MCH RBC QN AUTO: 29.6 PG (ref 25.2–33.5)
MCHC RBC AUTO-ENTMCNC: 32.9 G/DL (ref 28.4–34.8)
MCV RBC AUTO: 90.1 FL (ref 82.6–102.9)
MONOCYTES # BLD: 3 % (ref 3–12)
NRBC AUTOMATED: 0 PER 100 WBC
PARTIAL THROMBOPLASTIN TIME: 25.9 SEC (ref 23.9–33.8)
PDW BLD-RTO: 12.3 % (ref 11.8–14.4)
PLATELET # BLD: 240 K/UL (ref 138–453)
PLATELET ESTIMATE: ABNORMAL
PMV BLD AUTO: 10.6 FL (ref 8.1–13.5)
POTASSIUM SERPL-SCNC: 4.2 MMOL/L (ref 3.7–5.3)
PROTHROMBIN TIME: 12.9 SEC (ref 11.5–14.2)
RBC # BLD: 4.66 M/UL (ref 3.95–5.11)
RBC # BLD: ABNORMAL 10*6/UL
SEG NEUTROPHILS: 92 % (ref 36–65)
SEGMENTED NEUTROPHILS ABSOLUTE COUNT: 13.41 K/UL (ref 1.5–8.1)
SODIUM BLD-SCNC: 136 MMOL/L (ref 135–144)
WBC # BLD: 14.7 K/UL (ref 3.5–11.3)
WBC # BLD: ABNORMAL 10*3/UL

## 2021-05-14 PROCEDURE — 94761 N-INVAS EAR/PLS OXIMETRY MLT: CPT

## 2021-05-14 PROCEDURE — 36415 COLL VENOUS BLD VENIPUNCTURE: CPT

## 2021-05-14 PROCEDURE — 2580000003 HC RX 258: Performed by: INTERNAL MEDICINE

## 2021-05-14 PROCEDURE — 6360000002 HC RX W HCPCS: Performed by: INTERNAL MEDICINE

## 2021-05-14 PROCEDURE — 71046 X-RAY EXAM CHEST 2 VIEWS: CPT

## 2021-05-14 PROCEDURE — 85025 COMPLETE CBC W/AUTO DIFF WBC: CPT

## 2021-05-14 PROCEDURE — 93005 ELECTROCARDIOGRAM TRACING: CPT | Performed by: ORTHOPAEDIC SURGERY

## 2021-05-14 PROCEDURE — 85730 THROMBOPLASTIN TIME PARTIAL: CPT

## 2021-05-14 PROCEDURE — 73700 CT LOWER EXTREMITY W/O DYE: CPT

## 2021-05-14 PROCEDURE — 80048 BASIC METABOLIC PNL TOTAL CA: CPT

## 2021-05-14 PROCEDURE — 6370000000 HC RX 637 (ALT 250 FOR IP): Performed by: INTERNAL MEDICINE

## 2021-05-14 PROCEDURE — 1200000000 HC SEMI PRIVATE

## 2021-05-14 PROCEDURE — 85610 PROTHROMBIN TIME: CPT

## 2021-05-14 RX ORDER — MORPHINE SULFATE 2 MG/ML
2 INJECTION, SOLUTION INTRAMUSCULAR; INTRAVENOUS EVERY 4 HOURS PRN
Status: DISCONTINUED | OUTPATIENT
Start: 2021-05-14 | End: 2021-05-16 | Stop reason: HOSPADM

## 2021-05-14 RX ORDER — SODIUM CHLORIDE 0.9 % (FLUSH) 0.9 %
5-40 SYRINGE (ML) INJECTION EVERY 12 HOURS SCHEDULED
Status: DISCONTINUED | OUTPATIENT
Start: 2021-05-14 | End: 2021-05-16 | Stop reason: HOSPADM

## 2021-05-14 RX ORDER — LISINOPRIL AND HYDROCHLOROTHIAZIDE 12.5; 1 MG/1; MG/1
1 TABLET ORAL DAILY
Status: DISCONTINUED | OUTPATIENT
Start: 2021-05-15 | End: 2021-05-16 | Stop reason: HOSPADM

## 2021-05-14 RX ORDER — POLYETHYLENE GLYCOL 3350 17 G/17G
17 POWDER, FOR SOLUTION ORAL DAILY PRN
Status: DISCONTINUED | OUTPATIENT
Start: 2021-05-14 | End: 2021-05-16 | Stop reason: HOSPADM

## 2021-05-14 RX ORDER — SODIUM CHLORIDE 9 MG/ML
25 INJECTION, SOLUTION INTRAVENOUS PRN
Status: DISCONTINUED | OUTPATIENT
Start: 2021-05-14 | End: 2021-05-16 | Stop reason: HOSPADM

## 2021-05-14 RX ORDER — ALBUTEROL SULFATE 2.5 MG/3ML
1.25 SOLUTION RESPIRATORY (INHALATION) EVERY 6 HOURS PRN
Status: DISCONTINUED | OUTPATIENT
Start: 2021-05-14 | End: 2021-05-16 | Stop reason: HOSPADM

## 2021-05-14 RX ORDER — PROMETHAZINE HYDROCHLORIDE 25 MG/1
12.5 TABLET ORAL EVERY 6 HOURS PRN
Status: DISCONTINUED | OUTPATIENT
Start: 2021-05-14 | End: 2021-05-16 | Stop reason: HOSPADM

## 2021-05-14 RX ORDER — CETIRIZINE HYDROCHLORIDE 10 MG/1
10 TABLET ORAL NIGHTLY
Status: DISCONTINUED | OUTPATIENT
Start: 2021-05-14 | End: 2021-05-16 | Stop reason: HOSPADM

## 2021-05-14 RX ORDER — SODIUM CHLORIDE 0.9 % (FLUSH) 0.9 %
10 SYRINGE (ML) INJECTION PRN
Status: DISCONTINUED | OUTPATIENT
Start: 2021-05-14 | End: 2021-05-16 | Stop reason: HOSPADM

## 2021-05-14 RX ORDER — ONDANSETRON 2 MG/ML
4 INJECTION INTRAMUSCULAR; INTRAVENOUS EVERY 6 HOURS PRN
Status: DISCONTINUED | OUTPATIENT
Start: 2021-05-14 | End: 2021-05-16 | Stop reason: HOSPADM

## 2021-05-14 RX ORDER — BUPROPION HYDROCHLORIDE 150 MG/1
150 TABLET, EXTENDED RELEASE ORAL 3 TIMES DAILY
Status: DISCONTINUED | OUTPATIENT
Start: 2021-05-14 | End: 2021-05-16 | Stop reason: HOSPADM

## 2021-05-14 RX ORDER — ATORVASTATIN CALCIUM 20 MG/1
20 TABLET, FILM COATED ORAL DAILY
Status: DISCONTINUED | OUTPATIENT
Start: 2021-05-14 | End: 2021-05-16 | Stop reason: HOSPADM

## 2021-05-14 RX ORDER — SODIUM CHLORIDE 9 MG/ML
INJECTION, SOLUTION INTRAVENOUS CONTINUOUS
Status: DISCONTINUED | OUTPATIENT
Start: 2021-05-14 | End: 2021-05-16

## 2021-05-14 RX ORDER — ACETAMINOPHEN 325 MG/1
650 TABLET ORAL EVERY 6 HOURS PRN
Status: DISCONTINUED | OUTPATIENT
Start: 2021-05-14 | End: 2021-05-16 | Stop reason: HOSPADM

## 2021-05-14 RX ORDER — ACETAMINOPHEN 650 MG/1
650 SUPPOSITORY RECTAL EVERY 6 HOURS PRN
Status: DISCONTINUED | OUTPATIENT
Start: 2021-05-14 | End: 2021-05-16 | Stop reason: HOSPADM

## 2021-05-14 RX ORDER — METOPROLOL TARTRATE 50 MG/1
50 TABLET, FILM COATED ORAL 2 TIMES DAILY
Status: DISCONTINUED | OUTPATIENT
Start: 2021-05-14 | End: 2021-05-16 | Stop reason: HOSPADM

## 2021-05-14 RX ADMIN — BUPROPION HYDROCHLORIDE 150 MG: 150 TABLET, EXTENDED RELEASE ORAL at 22:44

## 2021-05-14 RX ADMIN — MORPHINE SULFATE 2 MG: 2 INJECTION, SOLUTION INTRAMUSCULAR; INTRAVENOUS at 21:00

## 2021-05-14 RX ADMIN — METOPROLOL TARTRATE 50 MG: 50 TABLET, FILM COATED ORAL at 22:44

## 2021-05-14 RX ADMIN — ATORVASTATIN CALCIUM 20 MG: 20 TABLET, FILM COATED ORAL at 23:21

## 2021-05-14 RX ADMIN — SODIUM CHLORIDE: 9 INJECTION, SOLUTION INTRAVENOUS at 22:44

## 2021-05-14 RX ADMIN — CETIRIZINE HYDROCHLORIDE 10 MG: 10 TABLET, FILM COATED ORAL at 22:44

## 2021-05-14 ASSESSMENT — PAIN DESCRIPTION - FREQUENCY: FREQUENCY: CONTINUOUS

## 2021-05-14 ASSESSMENT — PAIN DESCRIPTION - LOCATION: LOCATION: ANKLE

## 2021-05-14 ASSESSMENT — PAIN DESCRIPTION - ORIENTATION: ORIENTATION: LEFT

## 2021-05-14 ASSESSMENT — PAIN - FUNCTIONAL ASSESSMENT: PAIN_FUNCTIONAL_ASSESSMENT: PREVENTS OR INTERFERES WITH MANY ACTIVE NOT PASSIVE ACTIVITIES

## 2021-05-14 ASSESSMENT — PAIN DESCRIPTION - DESCRIPTORS: DESCRIPTORS: ACHING

## 2021-05-14 ASSESSMENT — PAIN DESCRIPTION - PAIN TYPE: TYPE: ACUTE PAIN

## 2021-05-15 ENCOUNTER — APPOINTMENT (OUTPATIENT)
Dept: GENERAL RADIOLOGY | Age: 61
DRG: 494 | End: 2021-05-15
Attending: INTERNAL MEDICINE
Payer: MEDICARE

## 2021-05-15 ENCOUNTER — ANESTHESIA EVENT (OUTPATIENT)
Dept: OPERATING ROOM | Age: 61
DRG: 494 | End: 2021-05-15
Payer: MEDICARE

## 2021-05-15 ENCOUNTER — ANESTHESIA (OUTPATIENT)
Dept: OPERATING ROOM | Age: 61
DRG: 494 | End: 2021-05-15
Payer: MEDICARE

## 2021-05-15 VITALS — SYSTOLIC BLOOD PRESSURE: 121 MMHG | DIASTOLIC BLOOD PRESSURE: 69 MMHG | OXYGEN SATURATION: 100 %

## 2021-05-15 PROBLEM — I10 ESSENTIAL HYPERTENSION: Chronic | Status: ACTIVE | Noted: 2021-05-15

## 2021-05-15 PROBLEM — E78.2 MIXED HYPERLIPIDEMIA: Chronic | Status: ACTIVE | Noted: 2021-05-15

## 2021-05-15 LAB
ABSOLUTE EOS #: 0.12 K/UL (ref 0–0.44)
ABSOLUTE IMMATURE GRANULOCYTE: <0.03 K/UL (ref 0–0.3)
ABSOLUTE LYMPH #: 1.39 K/UL (ref 1.1–3.7)
ABSOLUTE MONO #: 0.75 K/UL (ref 0.1–1.2)
ALBUMIN SERPL-MCNC: 3.7 G/DL (ref 3.5–5.2)
ALBUMIN/GLOBULIN RATIO: 1.5 (ref 1–2.5)
ALP BLD-CCNC: 89 U/L (ref 35–104)
ALT SERPL-CCNC: 13 U/L (ref 5–33)
ANION GAP SERPL CALCULATED.3IONS-SCNC: 9 MMOL/L (ref 9–17)
AST SERPL-CCNC: 21 U/L
BASOPHILS # BLD: 0 % (ref 0–2)
BASOPHILS ABSOLUTE: <0.03 K/UL (ref 0–0.2)
BILIRUB SERPL-MCNC: 0.36 MG/DL (ref 0.3–1.2)
BUN BLDV-MCNC: 14 MG/DL (ref 8–23)
BUN/CREAT BLD: 13 (ref 9–20)
CALCIUM SERPL-MCNC: 9.1 MG/DL (ref 8.6–10.4)
CHLORIDE BLD-SCNC: 104 MMOL/L (ref 98–107)
CO2: 29 MMOL/L (ref 20–31)
CREAT SERPL-MCNC: 1.07 MG/DL (ref 0.5–0.9)
DIFFERENTIAL TYPE: ABNORMAL
EKG ATRIAL RATE: 89 BPM
EKG P AXIS: 39 DEGREES
EKG P-R INTERVAL: 142 MS
EKG Q-T INTERVAL: 390 MS
EKG QRS DURATION: 88 MS
EKG QTC CALCULATION (BAZETT): 474 MS
EKG R AXIS: 11 DEGREES
EKG T AXIS: 55 DEGREES
EKG VENTRICULAR RATE: 89 BPM
EOSINOPHILS RELATIVE PERCENT: 1 % (ref 1–4)
GFR AFRICAN AMERICAN: >60 ML/MIN
GFR NON-AFRICAN AMERICAN: 52 ML/MIN
GFR SERPL CREATININE-BSD FRML MDRD: ABNORMAL ML/MIN/{1.73_M2}
GFR SERPL CREATININE-BSD FRML MDRD: ABNORMAL ML/MIN/{1.73_M2}
GLUCOSE BLD-MCNC: 113 MG/DL (ref 70–99)
HCT VFR BLD CALC: 38.4 % (ref 36.3–47.1)
HEMOGLOBIN: 12.2 G/DL (ref 11.9–15.1)
IMMATURE GRANULOCYTES: 0 %
LYMPHOCYTES # BLD: 15 % (ref 24–43)
MCH RBC QN AUTO: 29.3 PG (ref 25.2–33.5)
MCHC RBC AUTO-ENTMCNC: 31.8 G/DL (ref 28.4–34.8)
MCV RBC AUTO: 92.3 FL (ref 82.6–102.9)
MONOCYTES # BLD: 8 % (ref 3–12)
NRBC AUTOMATED: 0 PER 100 WBC
PDW BLD-RTO: 12.4 % (ref 11.8–14.4)
PLATELET # BLD: 199 K/UL (ref 138–453)
PLATELET ESTIMATE: ABNORMAL
PMV BLD AUTO: 10.9 FL (ref 8.1–13.5)
POTASSIUM SERPL-SCNC: 4.1 MMOL/L (ref 3.7–5.3)
RBC # BLD: 4.16 M/UL (ref 3.95–5.11)
RBC # BLD: ABNORMAL 10*6/UL
SEG NEUTROPHILS: 75 % (ref 36–65)
SEGMENTED NEUTROPHILS ABSOLUTE COUNT: 6.86 K/UL (ref 1.5–8.1)
SODIUM BLD-SCNC: 142 MMOL/L (ref 135–144)
TOTAL PROTEIN: 6.1 G/DL (ref 6.4–8.3)
WBC # BLD: 9.2 K/UL (ref 3.5–11.3)
WBC # BLD: ABNORMAL 10*3/UL

## 2021-05-15 PROCEDURE — 6360000002 HC RX W HCPCS: Performed by: INTERNAL MEDICINE

## 2021-05-15 PROCEDURE — 6360000002 HC RX W HCPCS: Performed by: ORTHOPAEDIC SURGERY

## 2021-05-15 PROCEDURE — 93010 ELECTROCARDIOGRAM REPORT: CPT | Performed by: INTERNAL MEDICINE

## 2021-05-15 PROCEDURE — 3700000001 HC ADD 15 MINUTES (ANESTHESIA): Performed by: ORTHOPAEDIC SURGERY

## 2021-05-15 PROCEDURE — 6370000000 HC RX 637 (ALT 250 FOR IP): Performed by: ORTHOPAEDIC SURGERY

## 2021-05-15 PROCEDURE — 7100000000 HC PACU RECOVERY - FIRST 15 MIN: Performed by: ORTHOPAEDIC SURGERY

## 2021-05-15 PROCEDURE — 7100000001 HC PACU RECOVERY - ADDTL 15 MIN: Performed by: ORTHOPAEDIC SURGERY

## 2021-05-15 PROCEDURE — 36415 COLL VENOUS BLD VENIPUNCTURE: CPT

## 2021-05-15 PROCEDURE — 1200000000 HC SEMI PRIVATE

## 2021-05-15 PROCEDURE — 6360000002 HC RX W HCPCS: Performed by: NURSE ANESTHETIST, CERTIFIED REGISTERED

## 2021-05-15 PROCEDURE — 85025 COMPLETE CBC W/AUTO DIFF WBC: CPT

## 2021-05-15 PROCEDURE — 2709999900 HC NON-CHARGEABLE SUPPLY: Performed by: ORTHOPAEDIC SURGERY

## 2021-05-15 PROCEDURE — 3600000014 HC SURGERY LEVEL 4 ADDTL 15MIN: Performed by: ORTHOPAEDIC SURGERY

## 2021-05-15 PROCEDURE — 80053 COMPREHEN METABOLIC PANEL: CPT

## 2021-05-15 PROCEDURE — 2500000003 HC RX 250 WO HCPCS: Performed by: NURSE ANESTHETIST, CERTIFIED REGISTERED

## 2021-05-15 PROCEDURE — 3209999900 FLUORO FOR SURGICAL PROCEDURES

## 2021-05-15 PROCEDURE — 94664 DEMO&/EVAL PT USE INHALER: CPT

## 2021-05-15 PROCEDURE — 94761 N-INVAS EAR/PLS OXIMETRY MLT: CPT

## 2021-05-15 PROCEDURE — 0QSH34Z REPOSITION LEFT TIBIA WITH INTERNAL FIXATION DEVICE, PERCUTANEOUS APPROACH: ICD-10-PCS | Performed by: ORTHOPAEDIC SURGERY

## 2021-05-15 PROCEDURE — 3600000004 HC SURGERY LEVEL 4 BASE: Performed by: ORTHOPAEDIC SURGERY

## 2021-05-15 PROCEDURE — 2580000003 HC RX 258: Performed by: INTERNAL MEDICINE

## 2021-05-15 PROCEDURE — 2700000000 HC OXYGEN THERAPY PER DAY

## 2021-05-15 PROCEDURE — 3700000000 HC ANESTHESIA ATTENDED CARE: Performed by: ORTHOPAEDIC SURGERY

## 2021-05-15 RX ORDER — CEFAZOLIN SODIUM 2 G/50ML
2000 SOLUTION INTRAVENOUS ONCE
Status: COMPLETED | OUTPATIENT
Start: 2021-05-15 | End: 2021-05-15

## 2021-05-15 RX ORDER — HYDROMORPHONE HCL 110MG/55ML
1 PATIENT CONTROLLED ANALGESIA SYRINGE INTRAVENOUS EVERY 4 HOURS PRN
Status: DISCONTINUED | OUTPATIENT
Start: 2021-05-15 | End: 2021-05-15

## 2021-05-15 RX ORDER — ONDANSETRON 2 MG/ML
4 INJECTION INTRAMUSCULAR; INTRAVENOUS
Status: DISCONTINUED | OUTPATIENT
Start: 2021-05-15 | End: 2021-05-15 | Stop reason: HOSPADM

## 2021-05-15 RX ORDER — CELECOXIB 200 MG/1
200 CAPSULE ORAL 2 TIMES DAILY
Status: COMPLETED | OUTPATIENT
Start: 2021-05-15 | End: 2021-05-16

## 2021-05-15 RX ORDER — HYDROCODONE BITARTRATE AND ACETAMINOPHEN 5; 325 MG/1; MG/1
2 TABLET ORAL EVERY 4 HOURS PRN
Status: DISCONTINUED | OUTPATIENT
Start: 2021-05-15 | End: 2021-05-16 | Stop reason: HOSPADM

## 2021-05-15 RX ORDER — DEXAMETHASONE SODIUM PHOSPHATE 4 MG/ML
INJECTION, SOLUTION INTRA-ARTICULAR; INTRALESIONAL; INTRAMUSCULAR; INTRAVENOUS; SOFT TISSUE PRN
Status: DISCONTINUED | OUTPATIENT
Start: 2021-05-15 | End: 2021-05-15 | Stop reason: SDUPTHER

## 2021-05-15 RX ORDER — HYDROCODONE BITARTRATE AND ACETAMINOPHEN 5; 325 MG/1; MG/1
1 TABLET ORAL EVERY 4 HOURS PRN
Qty: 40 TABLET | Refills: 0 | Status: SHIPPED | OUTPATIENT
Start: 2021-05-15 | End: 2021-05-22

## 2021-05-15 RX ORDER — HYDROMORPHONE HCL 110MG/55ML
2 PATIENT CONTROLLED ANALGESIA SYRINGE INTRAVENOUS EVERY 4 HOURS PRN
Status: DISCONTINUED | OUTPATIENT
Start: 2021-05-15 | End: 2021-05-15

## 2021-05-15 RX ORDER — PROPOFOL 10 MG/ML
INJECTION, EMULSION INTRAVENOUS PRN
Status: DISCONTINUED | OUTPATIENT
Start: 2021-05-15 | End: 2021-05-15 | Stop reason: SDUPTHER

## 2021-05-15 RX ORDER — HYDROCODONE BITARTRATE AND ACETAMINOPHEN 5; 325 MG/1; MG/1
1 TABLET ORAL EVERY 4 HOURS PRN
Status: DISCONTINUED | OUTPATIENT
Start: 2021-05-15 | End: 2021-05-16 | Stop reason: HOSPADM

## 2021-05-15 RX ORDER — FENTANYL CITRATE 50 UG/ML
50 INJECTION, SOLUTION INTRAMUSCULAR; INTRAVENOUS EVERY 5 MIN PRN
Status: DISCONTINUED | OUTPATIENT
Start: 2021-05-15 | End: 2021-05-15 | Stop reason: HOSPADM

## 2021-05-15 RX ORDER — LIDOCAINE HYDROCHLORIDE 20 MG/ML
INJECTION, SOLUTION EPIDURAL; INFILTRATION; INTRACAUDAL; PERINEURAL PRN
Status: DISCONTINUED | OUTPATIENT
Start: 2021-05-15 | End: 2021-05-15 | Stop reason: SDUPTHER

## 2021-05-15 RX ORDER — METOCLOPRAMIDE HYDROCHLORIDE 5 MG/ML
INJECTION INTRAMUSCULAR; INTRAVENOUS PRN
Status: DISCONTINUED | OUTPATIENT
Start: 2021-05-15 | End: 2021-05-15 | Stop reason: SDUPTHER

## 2021-05-15 RX ORDER — FENTANYL CITRATE 50 UG/ML
INJECTION, SOLUTION INTRAMUSCULAR; INTRAVENOUS PRN
Status: DISCONTINUED | OUTPATIENT
Start: 2021-05-15 | End: 2021-05-15 | Stop reason: SDUPTHER

## 2021-05-15 RX ADMIN — HYDROMORPHONE HYDROCHLORIDE 1 MG: 2 INJECTION, SOLUTION INTRAMUSCULAR; INTRAVENOUS; SUBCUTANEOUS at 09:40

## 2021-05-15 RX ADMIN — DEXAMETHASONE SODIUM PHOSPHATE 8 MG: 4 INJECTION, SOLUTION INTRAMUSCULAR; INTRAVENOUS at 13:39

## 2021-05-15 RX ADMIN — FAMOTIDINE 10 MG: 10 INJECTION, SOLUTION INTRAVENOUS at 13:31

## 2021-05-15 RX ADMIN — FAMOTIDINE 10 MG: 10 INJECTION, SOLUTION INTRAVENOUS at 13:36

## 2021-05-15 RX ADMIN — FENTANYL CITRATE 25 MCG: 50 INJECTION, SOLUTION INTRAMUSCULAR; INTRAVENOUS at 13:53

## 2021-05-15 RX ADMIN — METOCLOPRAMIDE 5 MG: 5 INJECTION, SOLUTION INTRAMUSCULAR; INTRAVENOUS at 13:31

## 2021-05-15 RX ADMIN — CETIRIZINE HYDROCHLORIDE 10 MG: 10 TABLET, FILM COATED ORAL at 20:35

## 2021-05-15 RX ADMIN — SODIUM CHLORIDE: 9 INJECTION, SOLUTION INTRAVENOUS at 09:44

## 2021-05-15 RX ADMIN — ONDANSETRON 4 MG: 2 INJECTION INTRAMUSCULAR; INTRAVENOUS at 09:41

## 2021-05-15 RX ADMIN — HYDROCODONE BITARTRATE AND ACETAMINOPHEN 2 TABLET: 5; 325 TABLET ORAL at 17:04

## 2021-05-15 RX ADMIN — LIDOCAINE HYDROCHLORIDE 50 MG: 20 INJECTION, SOLUTION EPIDURAL; INFILTRATION; INTRACAUDAL; PERINEURAL at 13:31

## 2021-05-15 RX ADMIN — MORPHINE SULFATE 2 MG: 2 INJECTION, SOLUTION INTRAMUSCULAR; INTRAVENOUS at 18:17

## 2021-05-15 RX ADMIN — CEFAZOLIN SODIUM 2000 MG: 2 SOLUTION INTRAVENOUS at 13:22

## 2021-05-15 RX ADMIN — METOCLOPRAMIDE 5 MG: 5 INJECTION, SOLUTION INTRAMUSCULAR; INTRAVENOUS at 13:36

## 2021-05-15 RX ADMIN — MORPHINE SULFATE 2 MG: 2 INJECTION, SOLUTION INTRAMUSCULAR; INTRAVENOUS at 01:10

## 2021-05-15 RX ADMIN — BUPROPION HYDROCHLORIDE 150 MG: 150 TABLET, EXTENDED RELEASE ORAL at 20:35

## 2021-05-15 RX ADMIN — PROPOFOL 150 MG: 10 INJECTION, EMULSION INTRAVENOUS at 13:31

## 2021-05-15 RX ADMIN — FENTANYL CITRATE 50 MCG: 50 INJECTION, SOLUTION INTRAMUSCULAR; INTRAVENOUS at 13:28

## 2021-05-15 RX ADMIN — HYDROMORPHONE HYDROCHLORIDE 1 MG: 1 INJECTION, SOLUTION INTRAMUSCULAR; INTRAVENOUS; SUBCUTANEOUS at 14:09

## 2021-05-15 RX ADMIN — MORPHINE SULFATE 2 MG: 2 INJECTION, SOLUTION INTRAMUSCULAR; INTRAVENOUS at 04:59

## 2021-05-15 RX ADMIN — CELECOXIB 200 MG: 200 CAPSULE ORAL at 20:35

## 2021-05-15 RX ADMIN — ATORVASTATIN CALCIUM 20 MG: 20 TABLET, FILM COATED ORAL at 20:35

## 2021-05-15 RX ADMIN — METOPROLOL TARTRATE 50 MG: 50 TABLET, FILM COATED ORAL at 20:35

## 2021-05-15 RX ADMIN — FENTANYL CITRATE 25 MCG: 50 INJECTION, SOLUTION INTRAMUSCULAR; INTRAVENOUS at 13:46

## 2021-05-15 ASSESSMENT — PAIN DESCRIPTION - FREQUENCY
FREQUENCY: CONTINUOUS

## 2021-05-15 ASSESSMENT — PAIN DESCRIPTION - DESCRIPTORS
DESCRIPTORS: ACHING

## 2021-05-15 ASSESSMENT — PAIN DESCRIPTION - LOCATION
LOCATION: ANKLE
LOCATION: ANKLE;FOOT
LOCATION: ANKLE
LOCATION: ANKLE;FOOT
LOCATION: FOOT
LOCATION: ANKLE;FOOT
LOCATION: FOOT
LOCATION: ANKLE;FOOT
LOCATION: ANKLE;FOOT
LOCATION: FOOT

## 2021-05-15 ASSESSMENT — PAIN DESCRIPTION - PAIN TYPE
TYPE: ACUTE PAIN
TYPE: ACUTE PAIN;SURGICAL PAIN
TYPE: ACUTE PAIN
TYPE: ACUTE PAIN;SURGICAL PAIN

## 2021-05-15 ASSESSMENT — PAIN - FUNCTIONAL ASSESSMENT
PAIN_FUNCTIONAL_ASSESSMENT: PREVENTS OR INTERFERES SOME ACTIVE ACTIVITIES AND ADLS
PAIN_FUNCTIONAL_ASSESSMENT: PREVENTS OR INTERFERES WITH MANY ACTIVE NOT PASSIVE ACTIVITIES
PAIN_FUNCTIONAL_ASSESSMENT: 0-10
PAIN_FUNCTIONAL_ASSESSMENT: PREVENTS OR INTERFERES SOME ACTIVE ACTIVITIES AND ADLS
PAIN_FUNCTIONAL_ASSESSMENT: PREVENTS OR INTERFERES SOME ACTIVE ACTIVITIES AND ADLS
PAIN_FUNCTIONAL_ASSESSMENT: 0-10
PAIN_FUNCTIONAL_ASSESSMENT: PREVENTS OR INTERFERES SOME ACTIVE ACTIVITIES AND ADLS

## 2021-05-15 ASSESSMENT — PAIN DESCRIPTION - PROGRESSION
CLINICAL_PROGRESSION: NOT CHANGED
CLINICAL_PROGRESSION: GRADUALLY WORSENING
CLINICAL_PROGRESSION: NOT CHANGED
CLINICAL_PROGRESSION: GRADUALLY IMPROVING
CLINICAL_PROGRESSION: NOT CHANGED
CLINICAL_PROGRESSION: NOT CHANGED

## 2021-05-15 ASSESSMENT — LIFESTYLE VARIABLES: SMOKING_STATUS: 1

## 2021-05-15 ASSESSMENT — PAIN DESCRIPTION - ORIENTATION
ORIENTATION: LEFT

## 2021-05-15 ASSESSMENT — PAIN SCALES - GENERAL
PAINLEVEL_OUTOF10: 10
PAINLEVEL_OUTOF10: 7
PAINLEVEL_OUTOF10: 7
PAINLEVEL_OUTOF10: 6
PAINLEVEL_OUTOF10: 7
PAINLEVEL_OUTOF10: 7
PAINLEVEL_OUTOF10: 8

## 2021-05-15 ASSESSMENT — PAIN DESCRIPTION - ONSET
ONSET: ON-GOING

## 2021-05-15 ASSESSMENT — COPD QUESTIONNAIRES: CAT_SEVERITY: MILD

## 2021-05-15 NOTE — PROGRESS NOTES
Spoke with Dr. Bridger Pérez at this time regarding patients request for a pain medication other than morphine. Per Dr. Bridger Pérez, patient can have dilaudid 1-2 mg every 4 hours PRN. See orders for details.

## 2021-05-15 NOTE — ANESTHESIA POSTPROCEDURE EVALUATION
Department of Anesthesiology  Postprocedure Note    Patient: Kailash Carter  MRN: [id number]  [place]: 1960  Date of evaluation: 5/15/2021  Time:  2:37 PM     Procedure Summary     Date: 05/15/21 Room / Location: [address] [place] [address]    Anesthesia Start: 1900 Anesthesia Stop: 1405    Procedure: LEFT ANKLE CLOSED REDUCTION WITH SPLINT PLACEMENT (Left Ankle) Diagnosis: (Fractured Left ankle)    Surgeons: Niru Velazquez MD Responsible Provider: Eulene Holstein, APRN - CRNA    Anesthesia Type: general ASA Status: 3          Anesthesia Type: general    Jomar Phase I: Jomar Score: 10    Jomar Phase II:      Last vitals: Reviewed and per EMR flowsheets. Anesthesia Post Evaluation    Patient location during evaluation: PACU  Patient participation: complete - patient participated  Level of consciousness: awake and alert  Pain score: 6  Airway patency: patent  Nausea & Vomiting: no nausea and no vomiting  Complications: no  Cardiovascular status: blood pressure returned to baseline  Respiratory status: acceptable and room air  Hydration status: stable  Comments: Pain improving after Dilaudid given. Discussion with Dr. Dustin Turcios was for pain block only if ORIF done.   Dr. Dustin Turcios also wrote for oral narcotic prescription for after discharge

## 2021-05-15 NOTE — ANESTHESIA PRE PROCEDURE
Department of Anesthesiology  Preprocedure Note       Name:  Farhad Theodore   Age:  57 y.o.  :  1960                                          MRN:  [id number]         Date:  5/15/2021      Surgeon: Colin Rehman):  Herlinda Ellis MD    Procedure: Procedure(s):  ANKLE OPEN REDUCTION INTERNAL FIXATION    Medications prior to admission:   Prior to Admission medications    Medication Sig Start Date End Date Taking?  Authorizing Provider   medical marijuana *MEDICAL MARIJUANA Miscellaneous *MEDICAL MARIJUANA Miscellaneous 10/15/2019 Provider: 10-  [facility] ([id number]) 10/15/19  Yes Historical Provider, MD   naproxen (NAPROSYN) 500 MG tablet Naproxen Naproxen 500MG Oral Tablet 2019 Provider: Loan Schofield CNP 2019 [facility] SPECIAL[place] [address] ([id number]) 19  Yes Historical Provider, MD   Multiple Vitamins-Minerals (MULTIVITAMIN ADULT) TABS Multivitamin Adult Oral Tablet Multivitamin Adult Oral Tablet 2019 Provider: 2019  [place] [address] ([id number]) 19  Yes Historical Provider, MD   Multiple Vitamins-Minerals (PRESERVISION AREDS 2) CAPS Ascorbic Acid / Copper Gluconate / Nelta Earl / Cloria Sables / Lutein / Vitamin E / zeaxanthin / Zinc Oxide PreserVision AREDS 2 Oral Capsule 2019 Provider: 2019  [facility] ([id number]) 19  Yes Historical Provider, MD   buPROPion [facility] SR) 150 MG extended release tablet Take 150 mg by mouth 3 times daily  19  Yes Historical Provider, MD   nystatin (MYCOSTATIN) 272367 UNIT/GM ointment Nystatin Nystatin 456934 UNIT/GM External Ointment 2020 Provider: Neto Arora [facility] 2020 [facility] [place] 87 ([id number]) 20  Yes Historical Provider, MD   simvastatin (ZOCOR) 20 MG tablet Take 20 mg by mouth nightly   Yes Historical Provider, MD   metoprolol tartrate (LOPRESSOR) 50 MG tablet Take 50 mg by mouth 2 times daily   Yes Historical Provider, MD   lisinopril-hydroCHLOROthiazide (PRINZIDE;ZESTORETIC) 10-12.5 MG per tablet Take 1 tablet by mouth daily Patient states she takes half of 12.5 which is 6.25 once a day   Yes Historical Provider, MD   Cetirizine HCl (ZYRTEC ALLERGY PO) Take by mouth   Yes Historical Provider, MD   albuterol (ACCUNEB) 1.25 MG/3ML nebulizer solution Inhale 1 ampule into the lungs every 6 hours as needed for Wheezing    Historical Provider, MD       Current medications:    Current Facility-Administered Medications   Medication Dose Route Frequency Provider Last Rate Last Admin    HYDROmorphone (DILAUDID) injection 1 mg  1 mg Intravenous Q4H PRN Fabaino Brown MD   1 mg at 05/15/21 0940    HYDROmorphone (DILAUDID) injection 2 mg  2 mg Intravenous Q4H PRN Fabiano Brown MD        morphine (PF) injection 2 mg  2 mg Intravenous Q4H PRN Fabiano Brown MD   2 mg at 05/15/21 0459    0.9 % sodium chloride infusion   Intravenous Continuous Fabiano Brown MD 75 mL/hr at 05/15/21 0944 New Bag at 05/15/21 0944    albuterol (PROVENTIL) nebulizer solution 1.25 mg  1.25 mg Nebulization Q6H PRN Fabiano Brown MD        [facility]) extended release tablet 150 mg  150 mg Oral TID Fabiano Brown MD   150 mg at 05/14/21 2244    cetirizine (ZYRTEC) tablet 10 mg  10 mg Oral Nightly Fabiano Brown MD   10 mg at 05/14/21 2244    lisinopril-hydroCHLOROthiazide (PRINZIDE;ZESTORETIC) 10-12.5 MG per tablet 1 tablet  1 tablet Oral Daily Fabiano Brown MD        metoprolol tartrate (LOPRESSOR) tablet 50 mg  50 mg Oral BID Fabiano Brown MD   50 mg at 05/14/21 2244    atorvastatin (LIPITOR) tablet 20 mg  20 mg Oral Daily Fabiano Brown MD   20 mg at 05/14/21 2321    sodium chloride flush 0.9 % injection 5-40 mL  5-40 mL Intravenous 2 times per day Fabiano Brown MD        sodium chloride flush 0.9 % injection 10 mL  10 mL Intravenous PRN Fabiano Brown MD        0.9 % sodium chloride infusion  25 mL Intravenous PRN Nikki Stephen MD        [facility] tablet 12.5 mg  12.5 mg Oral Q6H PRN Nikki Stephen MD        Or    ondansetron [facility] injection 4 mg  4 mg Intravenous Q6H PRN Nikki Stephen MD   4 mg at 05/15/21 0941    polyethylene glycol (GLYCOLAX) packet 17 g  17 g Oral Daily PRN Nikki Stephen MD        acetaminophen (TYLENOL) tablet 650 mg  650 mg Oral Q6H PRN Nikki Stephen MD        Or    acetaminophen (TYLENOL) suppository 650 mg  650 mg Rectal Q6H PRN Nikki Stephen MD           Allergies:     Allergies   Allergen Reactions    Adhesive Tape     Dust Mite Extract     Molds & Smuts     Trazodone        Problem List:    Patient Active Problem List   Diagnosis Code    Lung mass R91.8    Parotid tumor D49.0    Trimalleolar fracture of left ankle, closed, initial encounter S82.852A    Essential hypertension I10    Mixed hyperlipidemia E78.2       Past Medical History:        Diagnosis Date    ADHD (attention deficit hyperactivity disorder)     Anxiety     Back pain     Chronic dental pain     Hypertension        Past Surgical History:        Procedure Laterality Date    BREAST LUMPECTOMY      DENTAL SURGERY      ROTATOR CUFF REPAIR      TOE SURGERY      TONSILLECTOMY      TUBAL LIGATION         Social History:    Social History     Tobacco Use    Smoking status: Current Every Day Smoker     Packs/day: 0.25     Types: Cigarettes   Substance Use Topics    Alcohol use: Yes     Comment: occ                                Ready to quit: Not Answered  Counseling given: Not Answered      Vital Signs (Current):   Vitals:    05/15/21 0507 05/15/21 0634 05/15/21 1145 05/15/21 1237   BP:  121/80 (!) 109/56 (!) 151/79   Pulse:  78 83 88   Resp:  18 18 16   Temp:  36.7 °C (98.1 °F) 36.7 °C (98 °F) 36.7 °C (98 °F)   TempSrc:  Temporal Temporal Temporal   SpO2: 92% 93% 92% 90%   Weight:       Height: BP Readings from Last 3 Encounters:   05/15/21 (!) 151/79   03/31/21 117/71   09/16/20 114/73       NPO Status: Time of last liquid consumption: 2330                        Time of last solid consumption: 2100                        Date of last liquid consumption: 05/14/21                        Date of last solid food consumption: 05/14/21    BMI:   Wt Readings from Last 3 Encounters:   05/14/21 213 lb 9.6 oz (96.9 kg)   03/31/21 212 lb (96.2 kg)   09/16/20 212 lb (96.2 kg)     Body mass index is 36.66 kg/m². CBC:   Lab Results   Component Value Date    WBC 9.2 05/15/2021    RBC 4.16 05/15/2021    HGB 12.2 05/15/2021    HCT 38.4 05/15/2021    MCV 92.3 05/15/2021    RDW 12.4 05/15/2021     05/15/2021       CMP:   Lab Results   Component Value Date     05/15/2021    K 4.1 05/15/2021     05/15/2021    CO2 29 05/15/2021    BUN 14 05/15/2021    CREATININE 1.07 05/15/2021    GFRAA >60 05/15/2021    LABGLOM 52 05/15/2021    GLUCOSE 113 05/15/2021    PROT 6.1 05/15/2021    CALCIUM 9.1 05/15/2021    BILITOT 0.36 05/15/2021    ALKPHOS 89 05/15/2021    AST 21 05/15/2021    ALT 13 05/15/2021       POC Tests: No results for input(s): POCGLU, POCNA, POCK, POCCL, POCBUN, POCHEMO, POCHCT in the last 72 hours.     Coags:   Lab Results   Component Value Date    PROTIME 12.9 05/14/2021    INR 1.0 05/14/2021    APTT 25.9 05/14/2021       HCG (If Applicable): No results found for: PREGTESTUR, PREGSERUM, HCG, HCGQUANT     ABGs: No results found for: PHART, PO2ART, DLQ6ILO, TYG9RRR, BEART, I4LFKYDU     Type & Screen (If Applicable):  No results found for: LABABO, LABRH    Drug/Infectious Status (If Applicable):  No results found for: HIV, HEPCAB    COVID-19 Screening (If Applicable): No results found for: COVID19        Anesthesia Evaluation  Patient summary reviewed and Nursing notes reviewed no history of anesthetic complications:   Airway: Mallampati: I  TM distance: >3 FB   Neck ROM: full Mouth opening: > = 3 FB Dental:      Comment: patient states her teeth are in \"bad condition\"    Pulmonary:   (+) COPD: mild,  wheezes,  current smoker                          ROS comment: probable COPD due to smoking, has chronic moist cough, hoarse/husky voice which patient states is her normal. CXR done yesterday - no acute process  smokes 1/2 PPD, vapes marijuana  hypoxia today 88-92% today on room air. Patient states this is due to \"allergies\". Does not feel SOB  PE comment: mild expiratory wheeze on right, left clear Cardiovascular:    (+) hypertension:, hyperlipidemia      ECG reviewed  Rhythm: regular  Rate: normal                    Neuro/Psych:   (+) psychiatric history: stable with treatmentdepression/anxiety              ROS comment: PTSD, ADHD GI/Hepatic/Renal:            ROS comment: mildly elevated Cr and decreased GFR. Endo/Other: Negative Endo/Other ROS             Pt had no PAT visit       Abdominal:   (+) obese,         Vascular: negative vascular ROS. Anesthesia Plan      general     ASA 3     (Patient agrees to adductor canal and popliteal blocks, if needed for pain managment, in PACU - as discussed with Dr. Krysten Nixon)  Induction: intravenous. Anesthetic plan and risks discussed with patient.                     Sally Dailey, APRN - CRNA   5/15/2021

## 2021-05-15 NOTE — PROGRESS NOTES
Pt arrives to [facility] with friend via wheelchair. Ambulates with crutches to bed with unsteady gait. Vital signs and assessment completed at this time. IV placed and blood drawn. Admission navigator completed and PHI signed. Orders received from Dr. Tung Poole. Pt to have surgery at 1pm tomorrow. Patient and spouse updated with plan. Pt oriented to room and call light. Pt resting comfortably in bed with call light and belongings within reach. Spouse at bedside.

## 2021-05-15 NOTE — PROGRESS NOTES
RESPIRATORY ASSESSMENT PROTOCOL                                                                                              Patient Name: Mahi Stallworth Room#: 2752/5633-85 : 1960     Admitting diagnosis: Dislocation of ankle [S93.06XA]  Trimalleolar fracture, left, sequela [S82.852S]  Trimalleolar fracture of left ankle, closed, initial encounter [S82.552A]       Medical History:   Past Medical History:   Diagnosis Date    ADHD (attention deficit hyperactivity disorder)     Anxiety     Back pain     Chronic dental pain     Hypertension        PATIENT ASSESSMENT    LABORATORY DATA  Hematology:   Lab Results   Component Value Date    WBC 14.7 2021    RBC 4.66 2021    HGB 13.8 2021    HCT 42.0 2021     2021     Chemistry:  No results found for: PHART, OAG8SXM, PO2ART, X1SDPLEW, LZV9NEK, PBEA    VITALS  Pulse: 96   Resp: 20  BP: (!) 153/94  SpO2: 92 % O2 Device: None (Room air)  Temp: 97.7 °F (36.5 °C)    SKIN COLOR  [x] Normal  [] Pale  [] Dusky  [] Cyanotic    RESPIRATORY PATTERN  [x] Normal  [] Dyspnea  [] Cheyne-Paul  [] Kussmaul  [] Biots    AMBULATORY  [] Yes  [] No  [x] With Assistance    PEAK FLOW  Predicted:     Personal Best:        VITAL CAPACITY  Predicted value:  ml  Actual Value:  ml  30% of Predicted:  ml  Patient Acuity 0 1 2 3 4 Score   Level of Concious (LOC) [x]  Alert & Oriented or Pt normal LOC []  Confused;follows directions []  Confused & uncooper-ative []  Obtunded []  Comatose 0   Respiratory Rate  (RR) [x]  Reg. rate & pattern. 12 - 20 bpm  []  Increased RR. Greater than 20 bpm   []  SOB w/ exertion or RR greater than 24 bpm []  Access- ory muscle use at rest. Abn.  resp. []  SOB at rest.   0   Bilateral Breath Sounds (BBS) [x]  Clear []  Diminish-ed bases  []  Diminish-ed t/o, or rales   []  Sporadic, scattered wheezes or rhonchi []  Persistentwheezes and, or absent BBS 0   Cough [x]  Strong, effective, & non-prod. []  Effective & prod. Less than 25 ml (2 TBSP) over past 24 hrs []  Ineffective & non-prod to less than 25 ML over past 24 hrs []  Ineffective and, or greater than 25 ml sputum prod. past 24 hrs. []  Nonspon- taneous; Requires suctioning 0   Pulmonary History  (PULM HX) []  No smoking and no chronic pulmonaryhistory []  Former smoker. Quit over 12 mos. ago [x]  Current smoker or quit w/ in 12 mos []  Pulm. History and, or 20 pk/yr smoking hx []  Admitted w/ acute pulm. dx and, or has been admitted w/ pulm. dx 2 or more times over past 12 mos 2   Surgical History this Admit  (SURG HX) [x]  No surgery []  General surgery []  Lower abdominal []  Thoracic or upper abdominal   []  Thoracic w/ pulm. disease 0   Chest X-Ray (CXR)/CT Scan [x]  Clear or not applicable []  Not available []  Atelect- asis or pleural effusions []  Localized infiltrate or pulm. edema []  Con-solidated Infiltrates, bilateral, or in more than 1 lobe 0   Slow or Forced VC, FEV1 OR PEFR (PULM FXN)  [x]  80% or greater, or not indicated []  Pt. unable to perform []  FEV1 or PEFR or VC 51-79%. []  FEV1 or PEFR or VC  30-49%   []  FEV1 or PEFR or VC less than 30%   0   TOTAL ACUITY: 2       CARE PLAN    If Acuity Level is 2, 3, or 4 in any of the following:    [] BILATERAL BREATH SOUNDS (BBS)     [x] PULMONARY HISTORY (PULM HX)  [] PULMONARY FUNCTION (PULM FX)    Goal: Improve respiratory functions in patients with airway disease and decrease WOB    [x] AEROSOL PROTOCOL    Total Acuity:   16-32  []  Secondary Assessment in 24 hrs Total Acuity:  9-15  []  Secondary Assessment in 24 hrs Total Acuity:  4-8  []  Secondary Assessment in 48 hrs Total Acuity:  0-3  [x]  Secondary Assessment in 72 hrs   HHN AEROSOL THERAPY with  [physician-ordered bronchodilator(s)] q 4 & Albuterol PRN q2 hrs. Breath-Actuated Neb if BBS Acuity = 4, and pt. can use MP. Notify physician if condition deteriorates.   [facility]N AEROSOL THERAPY with  [physician-ordered bronchodilator(s)]  QID and Albuterol PRN q4 hrs. Breath-Actuated Neb if BBS Acuity = 4, and pt. can use MP. Notify physician if condition deteriorates. MDI THERAPY with  2 actuations of [physician-ordered bronchodilator(s)] via spacer TID Albuterol and PRNq4 hrs. If unable to utilize MDI: HHN [physician-ordered bronchodilator(s)] TID and Albuterol PRN q4 hrs. Notify physician if condition deteriorates. MDI THERAPY with  [physician-ordered bronchodilator(s)] via spacer TID PRN. If unable to utilize MDI: HHN [physician-ordered bronchodilator(s)] TID PRN. Notify physician if condition deteriorates. If Acuity Level is 2, 3, or 4 in any of the following:    [] COUGH     [] SURGICAL HISTORY (SURG HX)  [] CHEST XRAY (CXR)    Goal: Improvement in sputum mobilization in patients with ineffective airway clearance. Reverse atelectasis. [] Bronchopulmonary Hygiene Protocol    Total Acuity:   16-32  []  Secondary Assessment in 24 hrs Total Acuity:  9-15  []  Secondary Assessment in 24 hrs Total Acuity:  4-8  []  Secondary Assessment in 48 hrs Total Acuity:  0-3  []  Secondary Assessment in 72 hrs   METANEB QID with [physician-ordered bronchodilator(s)] if CXR Acuity = 4; otherwise:  PD&P, PEP, or Vest QID & PRN  NT Sxn PRN for ineffective cough  METANEB QID with [physician-ordered bronchodilator(s)] if CXR Acuity = 4; otherwise:  PD&P, PEP, or Vest TID & PRN  NT Sxn PRN for ineffective cough  Instruct patient to self-perform IS q1hr WA   Directed Cough self-performed q1hr WA     If Acuity Level is 2 or above in the following:    [] PULMONARY HISTORY (PULM HX)    Goal: Assist patient in quitting smoking to slow or stop the progression of lung disease.     [] Smoking Cessation Protocol    SMOKING CESSATION EDUCATION provided according to policy [id number]: (jorge luis with an X)  ____Yes    ____ No     ____ NA    Smoking Cessation Booklet given:  ____Yes  ____No ____Ana Harrison Farleyvivek

## 2021-05-15 NOTE — PROGRESS NOTES
Pt verbalized readiness to get back to her room. Discharge Criteria    Inpatients must meet Criteria 1 through 7. All other patients are either YES or N/A. If a NO is chosen then Anesthesia or Surgeon must be notified. 1.  Minimum 30 minutes after last dose of sedative medication, minimum 120 minutes after last dose of reversal agent. Yes      2. Systolic BP stable within 20 mmHg for 30 minutes & systolic BP between 90 & 717 or within 10 mmHg of baseline. Yes      3. Pulse between 60 and 100 or within 10 bpm of baseline. Yes      4. Spontaneous respiratory rate >/= 10 per minute. Yes      5. SaO2 >/= 95 or  >/= baseline. Yes      6. Able to cough and swallow or return to baseline function. Yes      7. Alert and oriented or return to baseline mental status. Yes      8. Demonstrates controlled, coordinated movements, ambulates with steady gait, or return to baseline activity function. N/A      9. Minimal or no pain or nausea, or at a level tolerable and acceptable to patient. N/A      10. Takes and retains oral fluids as allowed. N/A      11. Procedural / perioperative site stable. Minimal or no bleeding. N/A          12. If GI endoscopy procedure, minimal or no abdominal distention or passing flatus. N/A      13. Written discharge instructions and emergency telephone number provided. N/A      14. Accompanied by a responsible adult.     N/A

## 2021-05-15 NOTE — H&P
Patient:  Damaso Tan  MRN: [id number]    Chief Complaint:  No chief complaint on file. History Obtained From:  patient, electronic medical record    PCP: OJ Zapata CNP    History of Present Illness: The patient is a 57 y.o. female who presents with fall with ankle fracture with no LOC. No SOB or CP. No issues with anesthesia. Past Medical History:        Diagnosis Date    ADHD (attention deficit hyperactivity disorder)     Anxiety     Back pain     Chronic dental pain     Hypertension        Past Surgical History:        Procedure Laterality Date    BREAST LUMPECTOMY      DENTAL SURGERY      ROTATOR CUFF REPAIR      TOE SURGERY      TONSILLECTOMY      TUBAL LIGATION         Family History:   History reviewed. No pertinent family history. Social History:   TOBACCO:   reports that she has been smoking cigarettes. She has been smoking about 0.25 packs per day. She does not have any smokeless tobacco history on file. ETOH:   reports current alcohol use. Allergies:  Adhesive tape, Dust mite extract, Molds & smuts, and Trazodone    Medications Prior to Admission:    Prior to Admission medications    Medication Sig Start Date End Date Taking?  Authorizing Provider   medical marijuana *MEDICAL MARIJUANA Miscellaneous *MEDICAL MARIJUANA Miscellaneous 10/15/2019 Provider: 10-  [place] [id number] ([id number]) 10/15/19  Yes Historical Provider, MD   naproxen (NAPROSYN) 500 MG tablet Naproxen Naproxen 500MG Oral Tablet 08/05/2019 Provider: Kiran Aguirre CNP 07- [facility] SPECIALTY [place] [phone] ([id number]) 7/11/19  Yes Historical Provider, MD   Multiple Vitamins-Minerals (MULTIVITAMIN ADULT) TABS Multivitamin Adult Oral Tablet Multivitamin Adult Oral Tablet 07/11/2019 Provider: 07-  [place] [address] ([id number]) 7/11/19  Yes Historical Provider, MD   Multiple Vitamins-Minerals (PRESERVISION AREDS 2) CAPS Ascorbic Acid / Copper Gluconate / Andrea Verdin / Almaz Mood / Littie Bone / Vitamin E / zeaxanthin / Zinc Oxide PreserVision AREDS 2 Oral Capsule 07/11/2019 Provider: 07-  [facility] ([id number]) 7/11/19  Yes Historical Provider, MD   buPROPion [facility] SR) 150 MG extended release tablet Take 150 mg by mouth 3 times daily  7/11/19  Yes Historical Provider, MD   nystatin (MYCOSTATIN) 393331 UNIT/GM ointment Nystatin Nystatin 408391 UNIT/GM External Ointment 08/13/2020 Provider: Robert Cagle CNP 08- [facility] [place] ([id number]) 8/13/20  Yes Historical Provider, MD   simvastatin (ZOCOR) 20 MG tablet Take 20 mg by mouth nightly   Yes Historical Provider, MD   metoprolol tartrate (LOPRESSOR) 50 MG tablet Take 50 mg by mouth 2 times daily   Yes Historical Provider, MD   lisinopril-hydroCHLOROthiazide (PRINZIDE;ZESTORETIC) 10-12.5 MG per tablet Take 1 tablet by mouth daily Patient states she takes half of 12.5 which is 6.25 once a day   Yes Historical Provider, MD   Cetirizine HCl (ZYRTEC ALLERGY PO) Take by mouth   Yes Historical Provider, MD   albuterol (ACCUNEB) 1.25 MG/3ML nebulizer solution Inhale 1 ampule into the lungs every 6 hours as needed for Wheezing    Historical Provider, MD       Review of Systems:  Constitutional:negative  for fevers, and negative for chills.   Eyes: negative for visual disturbance   ENT: negative for sore throat, negative nasal congestion, and negative for earache  Respiratory: negative for shortness of breath, negative for cough, and negative for wheezing  Cardiovascular: negative for chest pain, negative for palpitations, and negative for syncope  Gastrointestinal: negative for abdominal pain, negative for nausea,negative for vomiting, negative for diarrhea, negative for constipation, and negative for hematochezia or melena  Genitourinary: negative for dysuria, negative for urinary urgency, negative for urinary frequency, and negative for hematuria  Skin: negative for skin rash, and negative for skin lesions  Neurological: negative for unilateral weakness, numbness or tingling. Physical Exam:    Vitals:   Temp: 98.1 °F (36.7 °C)  BP: 121/80  Resp: 18  Pulse: 78  SpO2: 93 %  24HR INTAKE/OUTPUT:      Intake/Output Summary (Last 24 hours) at 5/15/2021 0829  Last data filed at 5/15/2021 0646  Gross per 24 hour   Intake 123.1 ml   Output 900 ml   Net -776.9 ml       Exam:  GEN:  alert and oriented to person, place and time  EYES: PERRL and Sclera nonicteric  NECK: supple,    PULM: clear to auscultation bilaterally- no wheezes, rales or rhonchi, normal air movement, no respiratory distress  COR: regular rate & rhythm  ABD:  soft, non-tender and non-distended  EXT:   LLE cast. RLE normal  NEURO: follows commands, DICKINSON, no deficits  SKIN:  no rashes or significant lesions  -----------------------------------------------------------------  Diagnostic Data:   Lab Results   Component Value Date    WBC 9.2 05/15/2021    HGB 12.2 05/15/2021     05/15/2021       Lab Results   Component Value Date    BUN 14 05/15/2021    CREATININE 1.07 (H) 05/15/2021     05/15/2021    K 4.1 05/15/2021    CALCIUM 9.1 05/15/2021     05/15/2021    CO2 29 05/15/2021    LABGLOM 52 (L) 05/15/2021       No results found for: Jordan Sale, EPITHUA, LEUKOCYTESUR, SPECGRAV, GLUCOSEU, KETUA, PROTEINU, HGBUR, CASTUA, CRYSTUA, BACTERIA, YEAST    No results found for: MYOGLOBIN, TROPONINT, CKTOTAL, CKMB, PROBNP    XR CHEST (2 VW)    Result Date: 5/14/2021  EXAMINATION: TWO XRAY VIEWS OF THE CHEST 5/14/2021 8:49 pm COMPARISON: CT chest September 9, 2020 and April 14, 2011 HISTORY: ORDERING SYSTEM PROVIDED HISTORY: pre-op TECHNOLOGIST PROVIDED HISTORY: pre-op FINDINGS: Scattered granulomata. The lungs are without acute focal process. There is no effusion or pneumothorax. The cardiomediastinal silhouette is without acute process.  The osseous structures are without acute process. No acute process. Old granulomatous disease. CT ANKLE LEFT WO CONTRAST    Result Date: 5/14/2021  EXAMINATION: CT OF THE LEFT ANKLE WITHOUT CONTRAST 5/14/2021 8:50 pm TECHNIQUE: CT of the left ankle was performed without the administration of intravenous contrast.  Multiplanar reformatted images are provided for review. Dose modulation, iterative reconstruction, and/or weight based adjustment of the mA/kV was utilized to reduce the radiation dose to as low as reasonably achievable. COMPARISON: None. HISTORY ORDERING SYSTEM PROVIDED HISTORY: pre-op TECHNOLOGIST PROVIDED HISTORY: pre-op FINDINGS: There is an acute comminuted oblique displaced fracture of the distal fibular metadiaphysis. Acute mildly comminuted and displaced fractures of the medial and posterior malleoli are also seen. There is approximately 12 mm lateral tibiotalar subluxation. The talar dome is normal in appearance. Small tibiotalar joint effusion. The subtalar joint is normal in appearance. The talonavicular and calcaneocuboid joints are within normal limits. The midfoot is unremarkable. Diffuse subcutaneous edema. The visualized tendons are grossly intact. No soft tissue gas or foreign body. Acute comminuted displaced trimalleolar fractures and lateral tibiotalar subluxation. Assessment:    Principal Problem:    Trimalleolar fracture of left ankle, closed, initial encounter  Active Problems:    Essential hypertension    Mixed hyperlipidemia  Resolved Problems:    * No resolved hospital problems. *      Patient Active Problem List    Diagnosis Date Noted    Essential hypertension 05/15/2021    Mixed hyperlipidemia 05/15/2021    Trimalleolar fracture of left ankle, closed, initial encounter 05/14/2021    Lung mass 08/27/2020    Parotid tumor 08/27/2020       Plan:     · This patient requires inpatient admission because of ankle fracture requiring surgery.   · Factors affecting the medical complexity of this patient include Principal Problem:  ·   Trimalleolar fracture of left ankle, closed, initial encounter  · Active Problems:  ·   Essential hypertension  ·   Mixed hyperlipidemia  · Resolved Problems:  ·   * No resolved hospital problems. *  ·   · Estimated length of stay is 3 days  · Surgery  · DVT prophylaxis  · High risk medication monitoring: zero    Patient is cleared for surgery. CORE MEASURES  Core measures including DVT prophylaxis, Code Status, Nutrition, Therapy Options, chart reviewed and advance directives reviewed at this visit.     Nikki Stephen MD  5/15/2021, 8:29 AM

## 2021-05-15 NOTE — CONSULTS
Department of Orthopedic Surgery  Attending Consult Note        Reason for Consult:  Left ankle fracture    CHIEF COMPLAINT:  I broke my left ankle    History Obtained From:  patient, electronic medical record    HISTORY OF PRESENT ILLNESS:               The patient is a 57 y.o. female who presents with acute onset left ankle pain. The patient was traveling down by [facility] yesterday with a friend. [place] went [place] when she lost her balance and fell. Patient was taken to a local ER where she was found to have a left ankle fracture/dislocation. It was reduced in the ER, but reduction was incomplete. The patient wanted to come back to [place] for definitive care, so she was discharge and directly admitted to [facility]. Currently, patient complains of intermittent, moderate, sharp pain localized to the left ankle. She rates pain as 7/10. Denies other sources of acute pain or injury. Pain is exacerbated by touch to the ankle or movement of the ankle.     Past Medical History:        Diagnosis Date    ADHD (attention deficit hyperactivity disorder)     Anxiety     Back pain     Chronic dental pain     Hypertension      Past Surgical History:        Procedure Laterality Date    BREAST LUMPECTOMY      DENTAL SURGERY      ROTATOR CUFF REPAIR      TOE SURGERY      TONSILLECTOMY      TUBAL LIGATION       Current Medications:   Current Facility-Administered Medications: HYDROmorphone (DILAUDID) injection 1 mg, 1 mg, Intravenous, Q4H PRN  HYDROmorphone (DILAUDID) injection 2 mg, 2 mg, Intravenous, Q4H PRN  ceFAZolin (ANCEF) 2000 mg in dextrose 3 % 50 mL IVPB (duplex), 2,000 mg, Intravenous, Once  morphine (PF) injection 2 mg, 2 mg, Intravenous, Q4H PRN  0.9 % sodium chloride infusion, , Intravenous, Continuous  albuterol (PROVENTIL) nebulizer solution 1.25 mg, 1.25 mg, Nebulization, Q6H PRN  buPROPion (WELLBUTRIN SR) extended release tablet 150 mg, 150 mg, Oral, TID  cetirizine (ZYRTEC) tablet 10 mg, 10 mg, Oral, Nightly  lisinopril-hydroCHLOROthiazide (PRINZIDE;ZESTORETIC) 10-12.5 MG per tablet 1 tablet, 1 tablet, Oral, Daily  metoprolol tartrate (LOPRESSOR) tablet 50 mg, 50 mg, Oral, BID  atorvastatin (LIPITOR) tablet 20 mg, 20 mg, Oral, Daily  sodium chloride flush 0.9 % injection 5-40 mL, 5-40 mL, Intravenous, 2 times per day  sodium chloride flush 0.9 % injection 10 mL, 10 mL, Intravenous, PRN  0.9 % sodium chloride infusion, 25 mL, Intravenous, PRN  promethazine (PHENERGAN) tablet 12.5 mg, 12.5 mg, Oral, Q6H PRN **OR** ondansetron (ZOFRAN) injection 4 mg, 4 mg, Intravenous, Q6H PRN  polyethylene glycol (GLYCOLAX) packet 17 g, 17 g, Oral, Daily PRN  acetaminophen (TYLENOL) tablet 650 mg, 650 mg, Oral, Q6H PRN **OR** acetaminophen (TYLENOL) suppository 650 mg, 650 mg, Rectal, Q6H PRN    Allergies:  Adhesive tape, Dust mite extract, Molds & smuts, and Trazodone    Social History:   Smokes 1/2 ppd  Drinks socially    Family History:   History reviewed. No pertinent family history. REVIEW OF SYSTEMS:     CONSTITUTIONAL: No weight loss, fever, chills, weakness or fatigue. HEENT:  No visual loss, blurred vision, double vision or yellow sclerae. No hearing loss, sneezing, congestion, runny nose or sore throat. SKIN: No rash or itching. CARDIOVASCULAR: No chest pain, chest pressure or chest discomfort. No palpitations or edema. RESPIRATORY: No shortness of breath, cough or sputum. GASTROINTESTINAL: No anorexia, nausea, vomiting or diarrhea. No abdominal pain or blood. GENITOURINARY: No burning on urination. No urinary urgency, No hematuria. NEUROLOGICAL: No headache, dizziness, syncope, paralysis, ataxia, numbness or tingling in the extremities. MUSCULOSKELETAL: as per HPI  HEMATOLOGIC: No anemia, bleeding or bruising. LYMPHATICS: No enlarged nodes. No history of splenectomy. PSYCHIATRIC: No depression or anxiety.    ENDOCRINOLOGIC: No reports of sweating, cold or heat intolerance. No polyuria or polydipsia. ALLERGIES: No history of asthma, hives, eczema or rhinitis. PHYSICAL EXAM:    VITALS:  BP (!) 151/79   Pulse 88   Temp 98 °F (36.7 °C) (Temporal)   Resp 16   Ht 5' 4\" (1.626 m)   Wt 213 lb 9.6 oz (96.9 kg)   SpO2 90%   BMI 36.66 kg/m²     CONSTITUTIONAL:  awake, alert & oriented x 3, cooperative, no apparent distress, and appears comfortable. Obese  HEENT: atraumatic, normocephalic, EOMI, mucous membranes are moist   LUNGS:  No increased work of breathing, no wheezing  CARDIOVASCULAR:  regular rate and rhythm, no peripheral cyanosis or edema  ABDOMEN:  soft, non-distended, non-tender, no guarding  MUSCULOSKELETAL:    Bilateral upper extremities: Skin intact. No erythema, ecchymosis or edema. Full ROM of the joints without pain. Nontender. Joints are stable. 5/5 strength with motor function intact. Sensation intact. 2+ radial pulses. Right lower extremity:  Skin intact without signs of trauma. Full ROM of the joints without pain. Nontender. Joints are stable. 5/5 strength with motor function intact. Sensation intact. 2+ pedal pulse. Left lower extremity:   Splint is in place on left ankle. No pain with knee or hip motion. Nontender hip and knee. Knee and hip are stable. Motor function grossly intact distally. Sensation intact. 2+ pedal pulse. DATA:    Radiology Review:  X-rays from outside hospital were reviewed. Left ankle x-rays reveal trimalleolar fracture/dislocation with interval improvement in alignment after reduction. However, the talus is still subluxed laterally approximately 50%. CT scan left ankle performed here at [facility] was also reviewed confirming trimalleolar ankle fracture with lateral subluxation of the talus. IMPRESSION/RECOMMENDATIONS:    1. Closed, left ankle trimalleolar fracture/dislocation  2. Smoker  3. Obesity    Reviewed findings with the patient.   Given the displaced nature of her injury

## 2021-05-16 VITALS
BODY MASS INDEX: 36.66 KG/M2 | DIASTOLIC BLOOD PRESSURE: 70 MMHG | TEMPERATURE: 98 F | RESPIRATION RATE: 18 BRPM | HEART RATE: 93 BPM | WEIGHT: 214.7 LBS | HEIGHT: 64 IN | SYSTOLIC BLOOD PRESSURE: 151 MMHG | OXYGEN SATURATION: 95 %

## 2021-05-16 LAB
ABSOLUTE EOS #: 0 K/UL (ref 0–0.44)
ABSOLUTE IMMATURE GRANULOCYTE: 0 K/UL (ref 0–0.3)
ABSOLUTE LYMPH #: 0.31 K/UL (ref 1.1–3.7)
ABSOLUTE MONO #: 0.23 K/UL (ref 0.1–1.2)
ALBUMIN SERPL-MCNC: 3.7 G/DL (ref 3.5–5.2)
ALBUMIN/GLOBULIN RATIO: 1.9 (ref 1–2.5)
ALP BLD-CCNC: 96 U/L (ref 35–104)
ALT SERPL-CCNC: 12 U/L (ref 5–33)
ANION GAP SERPL CALCULATED.3IONS-SCNC: 9 MMOL/L (ref 9–17)
AST SERPL-CCNC: 20 U/L
BASOPHILS # BLD: 0 % (ref 0–2)
BASOPHILS ABSOLUTE: 0 K/UL (ref 0–0.2)
BILIRUB SERPL-MCNC: 0.25 MG/DL (ref 0.3–1.2)
BUN BLDV-MCNC: 15 MG/DL (ref 8–23)
BUN/CREAT BLD: 21 (ref 9–20)
CALCIUM SERPL-MCNC: 9.1 MG/DL (ref 8.6–10.4)
CHLORIDE BLD-SCNC: 101 MMOL/L (ref 98–107)
CO2: 25 MMOL/L (ref 20–31)
CREAT SERPL-MCNC: 0.73 MG/DL (ref 0.5–0.9)
DIFFERENTIAL TYPE: ABNORMAL
EOSINOPHILS RELATIVE PERCENT: 0 % (ref 1–4)
GFR AFRICAN AMERICAN: >60 ML/MIN
GFR NON-AFRICAN AMERICAN: >60 ML/MIN
GFR SERPL CREATININE-BSD FRML MDRD: ABNORMAL ML/MIN/{1.73_M2}
GFR SERPL CREATININE-BSD FRML MDRD: ABNORMAL ML/MIN/{1.73_M2}
GLUCOSE BLD-MCNC: 122 MG/DL (ref 70–99)
HCT VFR BLD CALC: 34.6 % (ref 36.3–47.1)
HEMOGLOBIN: 11.4 G/DL (ref 11.9–15.1)
IMMATURE GRANULOCYTES: 0 %
LYMPHOCYTES # BLD: 4 % (ref 24–43)
MCH RBC QN AUTO: 30.2 PG (ref 25.2–33.5)
MCHC RBC AUTO-ENTMCNC: 32.9 G/DL (ref 28.4–34.8)
MCV RBC AUTO: 91.5 FL (ref 82.6–102.9)
MONOCYTES # BLD: 3 % (ref 3–12)
MORPHOLOGY: NORMAL
NRBC AUTOMATED: 0 PER 100 WBC
PDW BLD-RTO: 12.3 % (ref 11.8–14.4)
PLATELET # BLD: 178 K/UL (ref 138–453)
PLATELET ESTIMATE: ABNORMAL
PMV BLD AUTO: 10.9 FL (ref 8.1–13.5)
POTASSIUM SERPL-SCNC: 4.5 MMOL/L (ref 3.7–5.3)
RBC # BLD: 3.78 M/UL (ref 3.95–5.11)
RBC # BLD: ABNORMAL 10*6/UL
SEG NEUTROPHILS: 93 % (ref 36–65)
SEGMENTED NEUTROPHILS ABSOLUTE COUNT: 7.26 K/UL (ref 1.5–8.1)
SODIUM BLD-SCNC: 135 MMOL/L (ref 135–144)
TOTAL PROTEIN: 5.7 G/DL (ref 6.4–8.3)
WBC # BLD: 7.8 K/UL (ref 3.5–11.3)
WBC # BLD: ABNORMAL 10*3/UL

## 2021-05-16 PROCEDURE — 97535 SELF CARE MNGMENT TRAINING: CPT

## 2021-05-16 PROCEDURE — 6360000002 HC RX W HCPCS: Performed by: ORTHOPAEDIC SURGERY

## 2021-05-16 PROCEDURE — 2700000000 HC OXYGEN THERAPY PER DAY

## 2021-05-16 PROCEDURE — 85025 COMPLETE CBC W/AUTO DIFF WBC: CPT

## 2021-05-16 PROCEDURE — 2580000003 HC RX 258: Performed by: ORTHOPAEDIC SURGERY

## 2021-05-16 PROCEDURE — 97166 OT EVAL MOD COMPLEX 45 MIN: CPT

## 2021-05-16 PROCEDURE — 97116 GAIT TRAINING THERAPY: CPT

## 2021-05-16 PROCEDURE — 97162 PT EVAL MOD COMPLEX 30 MIN: CPT

## 2021-05-16 PROCEDURE — 94761 N-INVAS EAR/PLS OXIMETRY MLT: CPT

## 2021-05-16 PROCEDURE — 6370000000 HC RX 637 (ALT 250 FOR IP): Performed by: ORTHOPAEDIC SURGERY

## 2021-05-16 PROCEDURE — 80053 COMPREHEN METABOLIC PANEL: CPT

## 2021-05-16 PROCEDURE — 36415 COLL VENOUS BLD VENIPUNCTURE: CPT

## 2021-05-16 RX ADMIN — HYDROCODONE BITARTRATE AND ACETAMINOPHEN 2 TABLET: 5; 325 TABLET ORAL at 14:32

## 2021-05-16 RX ADMIN — BUPROPION HYDROCHLORIDE 150 MG: 150 TABLET, EXTENDED RELEASE ORAL at 09:12

## 2021-05-16 RX ADMIN — HYDROCODONE BITARTRATE AND ACETAMINOPHEN 1 TABLET: 5; 325 TABLET ORAL at 09:19

## 2021-05-16 RX ADMIN — SODIUM CHLORIDE, PRESERVATIVE FREE 10 ML: 5 INJECTION INTRAVENOUS at 09:14

## 2021-05-16 RX ADMIN — BUPROPION HYDROCHLORIDE 150 MG: 150 TABLET, EXTENDED RELEASE ORAL at 14:32

## 2021-05-16 RX ADMIN — CELECOXIB 200 MG: 200 CAPSULE ORAL at 09:11

## 2021-05-16 RX ADMIN — LISINOPRIL AND HYDROCHLOROTHIAZIDE 1 TABLET: 10; 12.5 TABLET ORAL at 09:12

## 2021-05-16 RX ADMIN — ENOXAPARIN SODIUM 40 MG: 40 INJECTION SUBCUTANEOUS at 09:13

## 2021-05-16 RX ADMIN — HYDROCODONE BITARTRATE AND ACETAMINOPHEN 2 TABLET: 5; 325 TABLET ORAL at 00:13

## 2021-05-16 RX ADMIN — METOPROLOL TARTRATE 50 MG: 50 TABLET, FILM COATED ORAL at 09:11

## 2021-05-16 ASSESSMENT — PAIN DESCRIPTION - PAIN TYPE
TYPE: SURGICAL PAIN
TYPE: SURGICAL PAIN
TYPE: SURGICAL PAIN;ACUTE PAIN

## 2021-05-16 ASSESSMENT — PAIN DESCRIPTION - ONSET
ONSET: ON-GOING

## 2021-05-16 ASSESSMENT — PAIN DESCRIPTION - PROGRESSION
CLINICAL_PROGRESSION: NOT CHANGED
CLINICAL_PROGRESSION: GRADUALLY IMPROVING
CLINICAL_PROGRESSION: GRADUALLY IMPROVING

## 2021-05-16 ASSESSMENT — PAIN SCALES - GENERAL
PAINLEVEL_OUTOF10: 6
PAINLEVEL_OUTOF10: 6
PAINLEVEL_OUTOF10: 7
PAINLEVEL_OUTOF10: 4

## 2021-05-16 ASSESSMENT — PAIN DESCRIPTION - ORIENTATION
ORIENTATION: LEFT

## 2021-05-16 ASSESSMENT — PAIN - FUNCTIONAL ASSESSMENT
PAIN_FUNCTIONAL_ASSESSMENT: PREVENTS OR INTERFERES SOME ACTIVE ACTIVITIES AND ADLS

## 2021-05-16 ASSESSMENT — PAIN DESCRIPTION - DESCRIPTORS
DESCRIPTORS: ACHING

## 2021-05-16 ASSESSMENT — PAIN DESCRIPTION - LOCATION
LOCATION: ANKLE;FOOT
LOCATION: ANKLE;FOOT

## 2021-05-16 ASSESSMENT — PAIN DESCRIPTION - FREQUENCY
FREQUENCY: CONTINUOUS

## 2021-05-16 NOTE — PROGRESS NOTES
Occupational Therapy   Occupational Therapy Initial Assessment  Date: 2021   Patient Name: Kulwant Calhoun  MRN: [id number]     : 1960    Date of Service: 2021    Discharge Recommendations:  Continue to assess pending progress       Assessment   Performance deficits / Impairments: Decreased functional mobility ; Decreased ADL status; Decreased strength;Decreased endurance  Assessment: Patient is a 58 y/o female seen s/p left ankle closed reduction trimalleolar fracture/dislocation. She presents with deconditioning and NWB to LLE orders following surgery, requiring increased assistance with ADLs. Patient to benefit from OT services in order to address these deficits. Treatment Diagnosis: M62.81- Generalized weakness  Prognosis: Good  Decision Making: Medium Complexity  OT Education: OT Role;Plan of Care;Transfer Training;ADL Adaptive Strategies; Equipment  Patient Education: Educated on OT discharge folder provided, specifically AE education for safety in home for stairs and in bathroom. Patient demonstrated G understanding. Barriers to Learning: none  REQUIRES OT FOLLOW UP: Yes  Activity Tolerance  Activity Tolerance: Patient Tolerated treatment well  Safety Devices  Safety Devices in place: Yes  Type of devices: Left in chair;Nurse notified;Call light within reach           Patient Diagnosis(es): The encounter diagnosis was Trimalleolar fracture of left ankle, closed, initial encounter. has a past medical history of ADHD (attention deficit hyperactivity disorder), Anxiety, Back pain, Chronic dental pain, and Hypertension. has a past surgical history that includes Tonsillectomy; Toe Surgery; Dental surgery; Tubal ligation; Rotator cuff repair; Breast lumpectomy; and Ankle Closed Reduction (Left, 05/15/2021).     Treatment Diagnosis: M62.81- Generalized weakness      Restrictions  Restrictions/Precautions  Restrictions/Precautions: Weight Bearing, General Precautions, Fall Risk  Lower Extremity Weight Bearing Restrictions  Left Lower Extremity Weight Bearing: Non Weight Bearing    Subjective   General  Chart Reviewed: Yes  Patient assessed for rehabilitation services?: Yes  Family / Caregiver Present: No  Referring Practitioner: Dr. Ramila Baird  Diagnosis: left ankle closed reduction trimalleolar fracture/dislocation. Subjective  Subjective: Patient reporting burning pain in LLE at 6/10. Pleasant and cooperative throughout entire evaluation. Increased education provided in regards to AE to [place] at home. Social/Functional History  Social/Functional History  Lives With: Spouse  Type of Home: House  Home Layout: One level  Home Access: Stairs to enter with rails  Entrance Stairs - Number of Steps: 4-5  Entrance Stairs - Rails: Left  Bathroom Shower/Tub: Tub/Shower unit  Bathroom Equipment: Toilet raiser, Grab bars in shower  Home Equipment: Cane, Rolling walker  ADL Assistance: Independent  Homemaking Assistance: Needs assistance  Occupation: On disability  IADL Comments: Pt states PTA she typically did not need AD at home, but takes a cane for outdoor/community ambulation. Additional Comments: Pt states she does sometimes needs assistance with cooking/cleaning d/t chronic back pain. Objective   Vision: Impaired  Vision Exceptions: Wears glasses at all times  Hearing: Within functional limits    Orientation  Overall Orientation Status: Within Functional Limits     Balance  Sitting Balance: Supervision  Standing Balance: Contact guard assistance  Functional Mobility  Functional - Mobility Device: Rolling Walker  Activity: To/from bathroom; Other  Assist Level: Contact guard assistance  Functional Mobility Comments: CGA to and from bathroom. Therapy room activities completed in order to assess safety/navigation with stairs and practice with crutches. Noted some increased anxiety when practicing activity with therapy educating on options and AE to utilize to increase safety at home.   ADL  Feeding: Setup;Modified independent   Grooming: Setup;Modified independent   UE Bathing: Setup;Stand by assistance  LE Bathing: Minimal assistance  UE Dressing: Setup;Modified independent   LE Dressing: Setup;Minimal assistance  Toileting: Contact guard assistance  Additional Comments: Completed ADL toileting this date with functional mobility to and from bathroom with CGA, good ability to follow NWB status. Cognition  Overall Cognitive Status: WFL                 LUE AROM (degrees)  LUE AROM : WFL  Left Hand AROM (degrees)  Left Hand AROM: WFL  RUE AROM (degrees)  RUE AROM : WFL  Right Hand AROM (degrees)  Right Hand AROM: WFL  LUE Strength  Gross LUE Strength: WFL  L Hand General: 4-/5  RUE Strength  Gross RUE Strength: WFL  R Hand General: 4-/5                   Plan   Plan  Times per week: 7  Times per day: Daily  Current Treatment Recommendations: Strengthening, Self-Care / ADL, Safety Education & Training, Endurance Training, Functional Mobility Training      AM-[facility] Score        AM-[facility] Inpatient Daily Activity Raw Score: 17 (05/16/21 Critical [facility] hospital)  AM-[facility]AC Inpatient ADL T-Scale Score : 37.26 (05/16/21 [facility])  ADL Inpatient CMS 0-100% Score: 50.11 (05/16/21 Critical access hospital)  ADL Inpatient CMS G-Code Modifier : CK (05/16/21 [facility])    Goals  Short term goals  Time Frame for Short term goals: 20 visits  Short term goal 1: Patient to complete TB bathing/dressing with setup. Short term goal 2: Patient to tolerate 15 minutes ther-ex/ther-act to improve strength/endurance for ADLs. Short term goal 3: Patient to demo functional mobility in room with LRAD maintaining LLE WB precautions with SUP.        Therapy Time   Individual Concurrent Group Co-treatment   Time In 0940         Time Out 1030         Minutes Crow 53, OTR/L

## 2021-05-16 NOTE — PROGRESS NOTES
Physical Therapy    Facility/Department: [facility] AT THE [facility] MED SURG  Initial Assessment    NAME: Kailash Carter  : 1960  MRN: [id number]    Date of Service: 2021    Discharge Recommendations:  Continue to assess pending progress, Home with assist PRN        Assessment   Body structures, Functions, Activity limitations: Decreased functional mobility ; Decreased strength;Decreased endurance;Decreased ADL status; Decreased high-level IADLs;Decreased ROM; Decreased balance  Assessment: Pt is a 56 y/o female admitted for L ankle dislocation. Pt is currently NWB on LLE and, per pt's chart, will likely have surgery for trimalleolar fracture in ~1 week once edema has decreased. Pt currently performs transfers with CGA for safety. Pt ambulates with RW and CGA, maintaining NWB status on LLE. Pt ambulates ~10ft x2 and 5ft x3. Pt educated extensively on stair training, as pt reports 5-6 steps with 1 HR to enter/exit home. Pt educated on stair training with use of 1 HR and 1 axillary crutch, which pt performs with CGA/Heriberto, but demonstrates poor confidence with balance and stability. Pt also educated on stair navigation by scooting on her butt, which pt reports having done in the past but concerned about ability to stand once she is at the top of the steps. Pt reports possibly having hand rail installed before returning home. Pt also reports  is looking into knee scooter for ambulation. Pt will benefit from skilled PT services to improve gait, balance, and overall functional mobility to allow for safe return home. Treatment Diagnosis: generalized weakness, difficulty walking  Prognosis: Good  Decision Making: Medium Complexity  PT Education: PT Role;Plan of Care;General Safety;Gait Training;Transfer Training; Adaptive Device Training;Precautions;Weight-bearing Education; Functional Mobility Training  REQUIRES PT FOLLOW UP: Yes  Activity Tolerance  Activity Tolerance: Patient Tolerated treatment well       Patient days  Short term goal 1: Pt will be independent with transfers while maintaining NWB on LLE. Short term goal 2: Pt will ambulate >/= 25ft with RW at level of SBA and maintaining NWB on LLE for ease of functional ambulation. Short term goal 3: Pt will safely navigate stairs while maintaining NWB status on LLE for ease of entering/exiting home.        Therapy Time   Individual Concurrent Group Co-treatment   Time In 0940         Time Out [address] [place], [place], [facility]

## 2021-05-16 NOTE — PLAN OF CARE
Problem: Pain:  Goal: Pain level will decrease  Description: Pain level will decrease  5/16/2021 1029 by Rizwan Martinez RN  Outcome: Ongoing  Note: Pain assessed every 4 hours. Patient is able to communicate with staff the need for pain interventions. Patient currently complaining of pain in her left ankle. Pain medications available as needed. Will continue to monitor. Problem: Pain:  Goal: Control of acute pain  Description: Control of acute pain  5/16/2021 1029 by Rizwan Martinez RN  Outcome: Ongoing     Problem: Pain:  Goal: Control of chronic pain  Description: Control of chronic pain  5/16/2021 1029 by Rizwan Martinez RN  Outcome: Ongoing     Problem: Pain:  Goal: Patient's pain/discomfort is manageable  Description: Patient's pain/discomfort is manageable  5/16/2021 1029 by Rizwan Martinez RN  Outcome: Ongoing     Problem: Falls - Risk of:  Goal: Will remain free from falls  Description: Will remain free from falls  5/16/2021 1029 by Rizwan Martinez RN  Outcome: Ongoing  Note: Fall assessment completed daily. Bed in lowest position. Call light within reach. Falling star posted to outside of door. Fall risk sticker in place on ID band. Side rails up 2/4. Patient ambulates with a standby assist with a walker. Will continue to monitor. Problem: Falls - Risk of:  Goal: Absence of physical injury  Description: Absence of physical injury  5/16/2021 1029 by Rizwan Martinez RN  Outcome: Ongoing     Problem: Infection:  Goal: Will remain free from infection  Description: Will remain free from infection  5/16/2021 1029 by Rizwan Martinez RN  Outcome: Ongoing  Note: Patient has been afebrile thus far this shift. Will monitor labs and for other s/s of infection.       Problem: Safety:  Goal: Free from accidental physical injury  Description: Free from accidental physical injury  5/16/2021 1029 by Rizwan Martinez RN  Outcome: Ongoing     Problem: Safety:  Goal: Free from intentional harm  Description: Free from intentional harm  5/16/2021 1029 by Meredith Dunlap Faustino RN  Outcome: Ongoing     Problem: Daily Care:  Goal: Daily care needs are met  Description: Daily care needs are met  5/16/2021 1029 by Brandi Ramirez RN  Outcome: Ongoing  Note: Daily cares assessed and needs met according to patient condition. Patient reminded to ask for help when needed. Problem: Skin Integrity:  Goal: Skin integrity will stabilize  Description: Skin integrity will stabilize  5/16/2021 1029 by Brandi Ramirez RN  Outcome: Ongoing  Note: Skin assessment performed, no breakdown noted at this time. Patient skin is dry and intact. Will continue to monitor for redness or breakdown. Problem: Discharge Planning:  Goal: Patients continuum of care needs are met  Description: Patients continuum of care needs are met  5/16/2021 1029 by Brandi Ramirez RN  Outcome: Ongoing     Problem: Skin Integrity:  Goal: Will show no infection signs and symptoms  Description: Will show no infection signs and symptoms  Outcome: Ongoing     Problem: Skin Integrity:  Goal: Absence of new skin breakdown  Description: Absence of new skin breakdown  Outcome: Ongoing  Note: No new skin breakdown noted.

## 2021-05-16 NOTE — PLAN OF CARE
Problem: Pain:  Goal: Pain level will decrease  Description: Pain level will decrease  Outcome: Ongoing  Pt able to verbalize when in pain. States a 6 on a 0-10 pain scale. Will continue to monitor. Problem: Falls - Risk of:  Goal: Will remain free from falls  Description: Will remain free from falls  Outcome: Ongoing  Bed in lowest position. Wheels locked. 2/4 side rails up. Call light and belongings within reach. Pt calls out appropriately. Problem: Infection:  Goal: Will remain free from infection  Description: Will remain free from infection  Outcome: Ongoing   Vital signs WNL. Labs monitored daily.      Problem: Daily Care:  Goal: Daily care needs are met  Description: Daily care needs are met  Outcome: Ongoing     Problem: Skin Integrity:  Goal: Skin integrity will stabilize  Description: Skin integrity will stabilize  Outcome: Ongoing

## 2021-05-16 NOTE — PROGRESS NOTES
Progress Note    SUBJECTIVE:  FU related to pain is better. Unable to do surgery and likely plan is to do surgery 1 week after swelling has went down. Patient denies any shortness of breath or palpitations. OBJECTIVE:    Vitals:   TEMPERATURE:  Current - Temp: 97.8 °F (36.6 °C); Max - Temp  Av.9 °F (36.6 °C)  Min: 97 °F (36.1 °C)  Max: 99.5 °F (37.5 °C)  RESPIRATIONS RANGE: Resp  Avg: 15.3  Min: 12  Max: 20  PULSE RANGE: Pulse  Av.9  Min: 78  Max: 93  BLOOD PRESSURE RANGE:  Systolic (92YYA), DXX:793 , Min:102 , GVC:080   ; Diastolic (46WFT), CHL:03, Min:55, Max:85    PULSE OXIMETRY RANGE: SpO2  Av.8 %  Min: 86 %  Max: 100 %  24HR INTAKE/OUTPUT:      Intake/Output Summary (Last 24 hours) at 2021 0736  Last data filed at 2021 0647  Gross per 24 hour   Intake 3602.06 ml   Output 650 ml   Net 2952.06 ml     -----------------------------------------------------------------  Exam:  Alert and oriented. Regular heart rate. Clear lung sounds. Extremity has large bandage noted left lower extremity at the ankle. Sensation intact in the toes.       -----------------------------------------------------------------  Diagnostic Data:  Lab Results   Component Value Date    WBC 7.8 2021    HGB 11.4 (L) 2021     2021       Lab Results   Component Value Date    BUN 15 2021    CREATININE 0.73 2021     2021    K 4.5 2021    CALCIUM 9.1 2021     2021    CO2 25 2021    LABGLOM >60 2021       No results found for: [place], EPITHUA, LEUKOCYTESUR, SPECGRAV, GLUCOSEU, KETUA, PROTEINU, HGBUR, CASTUA, CRYSTUA, BACTERIA, YEAST    No results found for: MYOGLOBIN, TROPONINT, CKTOTAL, CKMB, PROBNP    XR CHEST (2 VW)    Result Date: 2021  EXAMINATION: TWO XRAY VIEWS OF THE CHEST 2021 8:49 pm COMPARISON: CT chest 2020 and 2011 HISTORY: ORDERING SYSTEM PROVIDED HISTORY: pre-op TECHNOLOGIST PROVIDED HISTORY: pre-op FINDINGS: Scattered granulomata. The lungs are without acute focal process. There is no effusion or pneumothorax. The cardiomediastinal silhouette is without acute process. The osseous structures are without acute process. No acute process. Old granulomatous disease. CT ANKLE LEFT WO CONTRAST    Result Date: 5/14/2021  EXAMINATION: CT OF THE LEFT ANKLE WITHOUT CONTRAST 5/14/2021 8:50 pm TECHNIQUE: CT of the left ankle was performed without the administration of intravenous contrast.  Multiplanar reformatted images are provided for review. Dose modulation, iterative reconstruction, and/or weight based adjustment of the mA/kV was utilized to reduce the radiation dose to as low as reasonably achievable. COMPARISON: None. HISTORY ORDERING SYSTEM PROVIDED HISTORY: pre-op TECHNOLOGIST PROVIDED HISTORY: pre-op FINDINGS: There is an acute comminuted oblique displaced fracture of the distal fibular metadiaphysis. Acute mildly comminuted and displaced fractures of the medial and posterior malleoli are also seen. There is approximately 12 mm lateral tibiotalar subluxation. The talar dome is normal in appearance. Small tibiotalar joint effusion. The subtalar joint is normal in appearance. The talonavicular and calcaneocuboid joints are within normal limits. The midfoot is unremarkable. Diffuse subcutaneous edema. The visualized tendons are grossly intact. No soft tissue gas or foreign body. Acute comminuted displaced trimalleolar fractures and lateral tibiotalar subluxation. FLUORO FOR SURGICAL PROCEDURES    Result Date: 5/15/2021  Radiology exam is complete. No Radiologist dictation. Please follow up with ordering provider. ASSESSMENT:    Principal Problem:    Trimalleolar fracture of left ankle, closed, initial encounter  Active Problems:    Essential hypertension    Mixed hyperlipidemia  Resolved Problems:    * No resolved hospital problems.  *      Patient Active Problem List Diagnosis Date Noted    Essential hypertension 05/15/2021    Mixed hyperlipidemia 05/15/2021    Trimalleolar fracture of left ankle, closed, initial encounter 05/14/2021    Lung mass 08/27/2020    Parotid tumor 08/27/2020       PLAN:  · Physical therapy with scooter. Likely if doing well will discharge home. DVT prophylaxis and pain medicine written by orthopedic.   · Critical Care Time: Filemon Lopez MD , M.D.

## 2021-09-08 ENCOUNTER — HOSPITAL ENCOUNTER (OUTPATIENT)
Age: 61
Discharge: HOME | End: 2021-09-08
Payer: MEDICARE

## 2021-09-08 ENCOUNTER — HOSPITAL ENCOUNTER (OUTPATIENT)
Dept: CT IMAGING | Age: 61
Discharge: HOME | End: 2021-09-10
Payer: MEDICARE

## 2021-09-08 DIAGNOSIS — D49.0 PAROTID TUMOR: ICD-10-CM

## 2021-09-08 DIAGNOSIS — R91.8 LUNG MASS: ICD-10-CM

## 2021-09-08 LAB
BUN BLDV-MCNC: 12 MG/DL (ref 8–23)
CREAT SERPL-MCNC: 0.92 MG/DL (ref 0.5–0.9)
GFR AFRICAN AMERICAN: >60 ML/MIN
GFR NON-AFRICAN AMERICAN: >60 ML/MIN
GFR SERPL CREATININE-BSD FRML MDRD: ABNORMAL ML/MIN/{1.73_M2}
GFR SERPL CREATININE-BSD FRML MDRD: ABNORMAL ML/MIN/{1.73_M2}

## 2021-09-08 PROCEDURE — 82565 ASSAY OF CREATININE: CPT

## 2021-09-08 PROCEDURE — 70491 CT SOFT TISSUE NECK W/DYE: CPT

## 2021-09-08 PROCEDURE — 71260 CT THORAX DX C+: CPT

## 2021-09-08 PROCEDURE — 36415 COLL VENOUS BLD VENIPUNCTURE: CPT

## 2021-09-08 PROCEDURE — 6360000004 HC RX CONTRAST MEDICATION: Performed by: INTERNAL MEDICINE

## 2021-09-08 PROCEDURE — 84520 ASSAY OF UREA NITROGEN: CPT

## 2021-09-08 RX ADMIN — IOPAMIDOL 150 ML: 755 INJECTION, SOLUTION INTRAVENOUS at 11:44

## 2021-09-15 ENCOUNTER — OFFICE VISIT (OUTPATIENT)
Dept: ONCOLOGY | Age: 61
End: 2021-09-15
Payer: MEDICARE

## 2021-09-15 VITALS
RESPIRATION RATE: 18 BRPM | HEART RATE: 75 BPM | BODY MASS INDEX: 35.19 KG/M2 | TEMPERATURE: 97.1 F | DIASTOLIC BLOOD PRESSURE: 63 MMHG | SYSTOLIC BLOOD PRESSURE: 103 MMHG | WEIGHT: 205 LBS

## 2021-09-15 DIAGNOSIS — R91.8 LUNG MASS: Primary | ICD-10-CM

## 2021-09-15 DIAGNOSIS — D49.0 PAROTID TUMOR: ICD-10-CM

## 2021-09-15 PROCEDURE — 4004F PT TOBACCO SCREEN RCVD TLK: CPT | Performed by: INTERNAL MEDICINE

## 2021-09-15 PROCEDURE — 99211 OFF/OP EST MAY X REQ PHY/QHP: CPT | Performed by: INTERNAL MEDICINE

## 2021-09-15 PROCEDURE — G8427 DOCREV CUR MEDS BY ELIG CLIN: HCPCS | Performed by: INTERNAL MEDICINE

## 2021-09-15 PROCEDURE — 99214 OFFICE O/P EST MOD 30 MIN: CPT | Performed by: INTERNAL MEDICINE

## 2021-09-15 PROCEDURE — G8417 CALC BMI ABV UP PARAM F/U: HCPCS | Performed by: INTERNAL MEDICINE

## 2021-09-15 PROCEDURE — 3017F COLORECTAL CA SCREEN DOC REV: CPT | Performed by: INTERNAL MEDICINE

## 2021-09-15 NOTE — PROGRESS NOTES
_     Chief Complaint   Patient presents with    Follow-up     lung mass paratoid tumor     DIAGNOSIS:       Right upper lobe lung mass  Bilateral parotid glands enlargement  Tobacco abuse      CURRENT THERAPY:         Observation and active surveillance. BRIEF CASE HISTORY:      Ms. Patricia Gay is a very pleasant 57 y.o. female with history of multiple co morbidities as listed. The patient is referred for evaluation of right upper lobe lung mass and parotid gland and possible lung tumor. Patient had feeling of enlarged bilateral submandibular areas for 1 to 2 years. She started noticing some painful areas about 8 months ago. For evaluation of the submandibular symptoms patient had soft tissue neck CT scan. [place]  She was noted to have slightly enlarged parotids but also incidentally discovered right upper lobe oval mass. CT scan of the chest March 9 showed same findings. Patient was referred for further evaluation however due to coronavirus pandemic she was not seen until today. Patient has no chest pain. No cough, sputum or hemoptysis. No weight loss or decreased appetite. No fever or night sweats. No enlarged lymph nodes. Patient smokes half pack per day for the last 40 years. Denies alcohol drinking. INTERIM HISTORY:   Patient is seen for follow-up enlarged submandibular glands and lung mass. Patient had CT scan of the chest and CT scan of the soft tissue neck which were suggestive of benign findings. Patient continues to smoke. She smokes about 8 to 10 cigarettes a day. Counseled about importance of tobacco cessation. Patient has occasional cough. No chest pain. No hemoptysis. She continues to feel the lumps in the parotid areas. No difficulty swallowing. No other lumps or masses.         PAST MEDICAL HISTORY: has a past medical history of ADHD (attention deficit hyperactivity disorder), Anxiety, Back pain, Chronic dental pain, and Hypertension. PAST SURGICAL HISTORY: has a past surgical history that includes Tonsillectomy; Toe Surgery; Dental surgery; Tubal ligation; Rotator cuff repair; Breast lumpectomy; Ankle Closed Reduction (Left, 05/15/2021); and Ankle fracture surgery (Left, 5/15/2021). CURRENT MEDICATIONS:  has a current medication list which includes the following prescription(s): albuterol, medical marijuana, naproxen, multivitamin adult, preservision areds 2, bupropion, nystatin, simvastatin, metoprolol tartrate, lisinopril-hydrochlorothiazide, cetirizine hcl, and enoxaparin. ALLERGIES:  is allergic to adhesive tape, dust mite extract, molds & smuts, and trazodone. FAMILY HISTORY: Brother had lung cancer. Sister had breast cancer. Otherwise negative for any hematological or oncological conditions. SOCIAL HISTORY:  reports that she has been smoking cigarettes. She has been smoking about 0.25 packs per day. She does not have any smokeless tobacco history on file. She reports current alcohol use. She reports current drug use. Frequency: 28.00 times per week. Drug: Marijuana. REVIEW OF SYSTEMS:     · General: No weakness or fatigue. No unanticipated weight loss or decreased appetite. No fever or chills. · Eyes: No blurred vision, eye pain or double vision. · Ears: No hearing problems or drainage. No tinnitus. · Throat: No sore throat, problems with swallowing or dysphagia. · Respiratory: No cough, sputum or hemoptysis. No shortness of breath. No pleuritic chest pain. · Cardiovascular: No chest pain, orthopnea or PND. No lower extremity edema. No palpitation. · Gastrointestinal: No problems with swallowing. No abdominal pain or bloating. No nausea or vomiting. No diarrhea or constipation. No GI bleeding. · Genitourinary: No dysuria, hematuria, frequency or urgency. · Musculoskeletal: No muscle aches or pains. No limitation of movement. No back pain.  No gait disturbance, No joint complaints. · Dermatologic: No skin rashes or pruritus. No skin lesions or discolorations. · Psychiatric: No depression, anxiety, or stress or signs of schizophrenia. No change in mood or affect. · Hematologic: No history of bleeding tendency. No bruises or ecchymosis. No history of clotting problems. · Infectious disease: No fever, chills or frequent infections. · Endocrine: No polydipsia or polyuria. No temperature intolerance. · Neurologic: No headaches or dizziness. No weakness or numbness of the extremities. No changes in balance, coordination,  memory, mentation, behavior. · Allergic/Immunologic: No nasal congestion or hives. No repeated infections. PHYSICAL EXAM:  The patient is not in acute distress. Vital signs: Blood pressure 103/63, pulse 75, temperature 97.1 °F (36.2 °C), temperature source Temporal, resp. rate 18, weight 205 lb (93 kg). General appearance - well appearing, not in pain or distress  Mental status - good mood, alert and oriented  Eyes - pupils equal and reactive, extraocular eye movements intact  Ears - bilateral TM's and external ear canals normal  Nose - normal and patent, no erythema, discharge or polyps  Mouth - mucous membranes moist, pharynx normal without lesions  Neck - supple, no significant adenopathy. Slightly enlarged bilateral parotids.   Lymphatics - no palpable lymphadenopathy, no hepatosplenomegaly  Chest - clear to auscultation, no wheezes, rales or rhonchi, symmetric air entry  Heart - normal rate, regular rhythm, normal S1, S2, no murmurs, rubs, clicks or gallops  Abdomen - soft, nontender, nondistended, no masses or organomegaly  Neurological - alert, oriented, normal speech, no focal findings or movement disorder noted  Musculoskeletal - no joint tenderness, deformity or swelling  Extremities - peripheral pulses normal, no pedal edema, no clubbing or cyanosis  Skin - normal coloration and turgor, no rashes, no suspicious skin lesions noted     Review of Diagnostic data:   Lab Results   Component Value Date    WBC 7.8 05/16/2021    HGB 11.4 (L) 05/16/2021    HCT 34.6 (L) 05/16/2021    MCV 91.5 05/16/2021     05/16/2021       Chemistry        Component Value Date/Time     05/16/2021 0605    K 4.5 05/16/2021 0605     05/16/2021 0605    CO2 25 05/16/2021 0605    BUN 12 09/08/2021 1108    CREATININE 0.92 (H) 09/08/2021 1108        Component Value Date/Time    CALCIUM 9.1 05/16/2021 0605    ALKPHOS 96 05/16/2021 0605    AST 20 05/16/2021 0605    ALT 12 05/16/2021 0605    BILITOT 0.25 (L) 05/16/2021 0605        CT CHEST W CONTRAST  Narrative: EXAMINATION:  CT OF THE CHEST WITH CONTRAST 9/8/2021 11:36 am    TECHNIQUE:  CT of the chest was performed with the administration of intravenous  contrast. Multiplanar reformatted images are provided for review. Dose  modulation, iterative reconstruction, and/or weight based adjustment of the  mA/kV was utilized to reduce the radiation dose to as low as reasonably  achievable. COMPARISON:  CT neck same date and prior CT chest September 9, 2020. HISTORY:  ORDERING SYSTEM PROVIDED HISTORY: Lung mass    FINDINGS:  Mediastinum: No mediastinal or hilar lymphadenopathy is identified. There is  again noted to be mild dilatation of the ascending thoracic aorta measuring  up to 3.9 cm, unchanged. Heart, pericardium, and great vessels are without  acute process. Lungs/pleura: Central airways are patent. A 2.4 x 3.4 cm well-circumscribed  scribed ovoid mass is again seen in the paravertebral medially right upper  lobe at the T2-T3 level. No widening of the adjacent right transverse  foramen. This is unchanged in size in appearance compared to prior study. No new or enlarging nodules identified. Multiple small scattered calcified  granulomas are again seen within the lungs. Upper Abdomen: Limited visualization of the upper abdomen is without acute  process.     Soft Tissues/Bones: There is mild compression deformity along the superior  endplate of T4, unchanged. Soft tissues and osseous structures are without  acute process. Impression: Stable appearance of the pleural based mass in the paravertebral region right  lung apex. Given stability this likely represents a benign tumor such as a  neurogenic tumor or a pleural fibroma. CT SOFT TISSUE NECK W CONTRAST  Narrative: EXAMINATION:  CT OF THE NECK SOFT TISSUE WITH CONTRAST  9/8/2021    TECHNIQUE:  CT of the neck was performed with the administration of intravenous contrast.  Multiplanar reformatted images are provided for review. Dose modulation,  iterative reconstruction, and/or weight based adjustment of the mA/kV was  utilized to reduce the radiation dose to as low as reasonably achievable. COMPARISON:  CT soft tissue neck performed 09/09/2020. HISTORY:  ORDERING SYSTEM PROVIDED HISTORY: Lung mass    FINDINGS:  PHARYNX/LARYNX:  The palatine tonsils are normal in appearance. The tongue  is normal in appearance. The valleculae, epiglottis, aryepiglottic folds and  pyriform sinuses appear unremarkable. The true and false vocal cords are  normal in appearance. No mass or abscess is seen. SALIVARY GLANDS/THYROID:  The submandibular glands appear unremarkable. There is redemonstration of a heterogeneous right parotid gland mass  measuring approximately 2.2 x 1.5 cm and this is similar compared to prior  examination. There is redemonstration of a heterogeneous left parotid gland  mass that measures 1.9 x 1.7 cm and this is similar compared to prior exam.  The thyroid gland appears unremarkable. LYMPH NODES:  No cervical or supraclavicular lymphadenopathy is seen. SOFT TISSUES:  No appreciable soft tissue swelling or mass is seen. BRAIN/ORBITS/SINUSES:  The visualized portion of the intracranial contents  appear unremarkable.   The visualized portion of the orbits, paranasal sinuses  and mastoid air cells demonstrate no acute abnormality. LUNG APICES/SUPERIOR MEDIASTINUM:  There is redemonstration of a  pleural-based perivertebral mass in the medial aspect of right upper lobe  measures approximately 3.4 x 2.4 cm and is not significantly changed compared  to prior examination. There is underlying emphysematous change within the  visualized lungs. No superior mediastinal lymphadenopathy or mass. The  visualized portion of the trachea appears unremarkable. BONES:  No aggressive appearing lytic or blastic bony lesion. Impression: Redemonstration of a pleural-based perivertebral mass in the medial aspect of  the right upper lobe that is not significantly changed in size or attenuation  compared to prior examination. Redemonstration of heterogeneous masses in the parotid glands bilaterally  that are not significantly changed. These again may relate to benign mixed  neoplasms or Warthin's tumors although continued attention on follow-up is  recommended. IMPRESSION:   Right upper lobe lung mass  Bilateral parotid glands enlargement  Tobacco abuse    PLAN: I reviewed the images from February and March 2020 and explained to the patient and her . Repeated CT scans from September 9, 2020 were reviewed and discussed with the patient as well. Most recent CT scan showed no changes which is quite reassuring and suggestive of benign underlying etiology. .  Findings are generally benign with no significant suspicious abnormalities. At the present time recommendations for observation. We will see her in 12 months. We will repeat CT scans before return visit. Sooner for any problems. Counseled about importance of tobacco cessation. Patient's questions were answered to the best of her satisfaction and she verbalized full understanding and agreement.

## 2022-09-08 ENCOUNTER — HOSPITAL ENCOUNTER (OUTPATIENT)
Dept: CT IMAGING | Age: 62
Discharge: HOME | End: 2022-09-10
Payer: MEDICARE

## 2022-09-08 ENCOUNTER — HOSPITAL ENCOUNTER (OUTPATIENT)
Age: 62
Discharge: HOME | End: 2022-09-08
Payer: MEDICARE

## 2022-09-08 DIAGNOSIS — D49.0 PAROTID TUMOR: ICD-10-CM

## 2022-09-08 DIAGNOSIS — R91.8 LUNG MASS: ICD-10-CM

## 2022-09-08 LAB
BUN BLDV-MCNC: 12 MG/DL (ref 8–23)
CREAT SERPL-MCNC: 0.88 MG/DL (ref 0.5–0.9)
GFR AFRICAN AMERICAN: >60 ML/MIN
GFR NON-AFRICAN AMERICAN: >60 ML/MIN
GFR SERPL CREATININE-BSD FRML MDRD: NORMAL ML/MIN/{1.73_M2}
GFR SERPL CREATININE-BSD FRML MDRD: NORMAL ML/MIN/{1.73_M2}

## 2022-09-08 PROCEDURE — 71260 CT THORAX DX C+: CPT

## 2022-09-08 PROCEDURE — 82565 ASSAY OF CREATININE: CPT

## 2022-09-08 PROCEDURE — 84520 ASSAY OF UREA NITROGEN: CPT

## 2022-09-08 PROCEDURE — 6360000004 HC RX CONTRAST MEDICATION: Performed by: INTERNAL MEDICINE

## 2022-09-08 PROCEDURE — 36415 COLL VENOUS BLD VENIPUNCTURE: CPT

## 2022-09-08 RX ADMIN — IOPAMIDOL 75 ML: 755 INJECTION, SOLUTION INTRAVENOUS at 12:26

## 2022-09-14 ENCOUNTER — OFFICE VISIT (OUTPATIENT)
Dept: ONCOLOGY | Age: 62
End: 2022-09-14
Payer: MEDICARE

## 2022-09-14 VITALS
TEMPERATURE: 97.2 F | HEART RATE: 70 BPM | DIASTOLIC BLOOD PRESSURE: 70 MMHG | WEIGHT: 210.4 LBS | BODY MASS INDEX: 36.12 KG/M2 | RESPIRATION RATE: 16 BRPM | SYSTOLIC BLOOD PRESSURE: 123 MMHG

## 2022-09-14 DIAGNOSIS — D49.0 PAROTID TUMOR: ICD-10-CM

## 2022-09-14 DIAGNOSIS — R91.8 LUNG MASS: Primary | ICD-10-CM

## 2022-09-14 PROCEDURE — G8417 CALC BMI ABV UP PARAM F/U: HCPCS | Performed by: INTERNAL MEDICINE

## 2022-09-14 PROCEDURE — 99211 OFF/OP EST MAY X REQ PHY/QHP: CPT | Performed by: INTERNAL MEDICINE

## 2022-09-14 PROCEDURE — 99214 OFFICE O/P EST MOD 30 MIN: CPT | Performed by: INTERNAL MEDICINE

## 2022-09-14 PROCEDURE — 4004F PT TOBACCO SCREEN RCVD TLK: CPT | Performed by: INTERNAL MEDICINE

## 2022-09-14 PROCEDURE — 3017F COLORECTAL CA SCREEN DOC REV: CPT | Performed by: INTERNAL MEDICINE

## 2022-09-14 PROCEDURE — G8428 CUR MEDS NOT DOCUMENT: HCPCS | Performed by: INTERNAL MEDICINE

## 2022-09-14 RX ORDER — MONTELUKAST SODIUM 10 MG/1
10 TABLET ORAL EVERY MORNING
COMMUNITY

## 2022-09-14 RX ORDER — FLUTICASONE PROPIONATE 50 MCG
1 SPRAY, SUSPENSION (ML) NASAL 2 TIMES DAILY
COMMUNITY

## 2022-09-14 NOTE — PROGRESS NOTES
_     Chief Complaint   Patient presents with    Follow-up     Lung mass parotid tumor     DIAGNOSIS:       Right upper lobe lung mass  Bilateral parotid glands enlargement  Tobacco abuse      CURRENT THERAPY:         Observation and active surveillance. BRIEF CASE HISTORY:      Ms. Ivelisse Garcia is a very pleasant 60 y.o. female with history of multiple co morbidities as listed. The patient is referred for evaluation of right upper lobe lung mass and parotid gland and possible lung tumor. Patient had feeling of enlarged bilateral submandibular areas for 1 to 2 years. She started noticing some painful areas about 8 months ago. For evaluation of the submandibular symptoms patient had soft tissue neck CT scan. Camila Billingsley She was noted to have slightly enlarged parotids but also incidentally discovered right upper lobe oval mass. CT scan of the chest March 9 showed same findings. Patient was referred for further evaluation however due to coronavirus pandemic she was not seen until today. Patient has no chest pain. No cough, sputum or hemoptysis. No weight loss or decreased appetite. No fever or night sweats. No enlarged lymph nodes. Patient smokes half pack per day for the last 40 years. Denies alcohol drinking. INTERIM HISTORY:   Patient is seen for follow-up enlarged submandibular glands and lung mass. Patient had CT scan of the chest and CT scan of the soft tissue neck which were suggestive of benign findings. Patient continues to smoke. She smokes about 8 to 10 cigarettes a day. Counseled about importance of tobacco cessation. Patient has occasional cough. No chest pain. No hemoptysis. She continues to feel the lumps in the parotid areas. No difficulty swallowing. No other lumps or masses.         PAST MEDICAL HISTORY: has a past medical history of ADHD (attention deficit hyperactivity disorder), Anxiety, Back pain, Chronic dental pain, and Hypertension. PAST SURGICAL HISTORY: has a past surgical history that includes Tonsillectomy; Toe Surgery; Dental surgery; Tubal ligation; Rotator cuff repair; Breast lumpectomy; Ankle Closed Reduction (Left, 05/15/2021); and Ankle fracture surgery (Left, 5/15/2021). CURRENT MEDICATIONS:  has a current medication list which includes the following prescription(s): fluticasone, montelukast, enoxaparin, albuterol, medical marijuana, naproxen, multivitamin adult, preservision areds 2, bupropion, nystatin, simvastatin, metoprolol tartrate, lisinopril-hydrochlorothiazide, and cetirizine hcl. ALLERGIES:  is allergic to adhesive tape, dust mite extract, molds & smuts, and trazodone. FAMILY HISTORY: Brother had lung cancer. Sister had breast cancer. Otherwise negative for any hematological or oncological conditions. SOCIAL HISTORY:  reports that she has been smoking cigarettes. She has been smoking an average of .25 packs per day. She does not have any smokeless tobacco history on file. She reports current alcohol use. She reports current drug use. Frequency: 28.00 times per week. Drug: Marijuana Addis Coil). REVIEW OF SYSTEMS:     General: No weakness or fatigue. No unanticipated weight loss or decreased appetite. No fever or chills. Eyes: No blurred vision, eye pain or double vision. Ears: No hearing problems or drainage. No tinnitus. Throat: No sore throat, problems with swallowing or dysphagia. Respiratory: No cough, sputum or hemoptysis. No shortness of breath. No pleuritic chest pain. Cardiovascular: No chest pain, orthopnea or PND. No lower extremity edema. No palpitation. Gastrointestinal: No problems with swallowing. No abdominal pain or bloating. No nausea or vomiting. No diarrhea or constipation. No GI bleeding. Genitourinary: No dysuria, hematuria, frequency or urgency. Musculoskeletal: No muscle aches or pains. No limitation of movement.  No back pain. No gait disturbance, No joint complaints. Dermatologic: No skin rashes or pruritus. No skin lesions or discolorations. Psychiatric: No depression, anxiety, or stress or signs of schizophrenia. No change in mood or affect. Hematologic: No history of bleeding tendency. No bruises or ecchymosis. No history of clotting problems. Infectious disease: No fever, chills or frequent infections. Endocrine: No polydipsia or polyuria. No temperature intolerance. Neurologic: No headaches or dizziness. No weakness or numbness of the extremities. No changes in balance, coordination,  memory, mentation, behavior. Allergic/Immunologic: No nasal congestion or hives. No repeated infections. PHYSICAL EXAM:  The patient is not in acute distress. Vital signs: Blood pressure 123/70, pulse 70, temperature 97.2 °F (36.2 °C), resp. rate 16, weight 210 lb 6.4 oz (95.4 kg). General appearance - well appearing, not in pain or distress  Mental status - good mood, alert and oriented  Eyes - pupils equal and reactive, extraocular eye movements intact  Ears - bilateral TM's and external ear canals normal  Nose - normal and patent, no erythema, discharge or polyps  Mouth - mucous membranes moist, pharynx normal without lesions  Neck - supple, no significant adenopathy. Slightly enlarged bilateral parotids.   Lymphatics - no palpable lymphadenopathy, no hepatosplenomegaly  Chest - clear to auscultation, no wheezes, rales or rhonchi, symmetric air entry  Heart - normal rate, regular rhythm, normal S1, S2, no murmurs, rubs, clicks or gallops  Abdomen - soft, nontender, nondistended, no masses or organomegaly  Neurological - alert, oriented, normal speech, no focal findings or movement disorder noted  Musculoskeletal - no joint tenderness, deformity or swelling  Extremities - peripheral pulses normal, no pedal edema, no clubbing or cyanosis  Skin - normal coloration and turgor, no rashes, no suspicious skin lesions noted Review of Diagnostic data:   Lab Results   Component Value Date    WBC 7.8 05/16/2021    HGB 11.4 (L) 05/16/2021    HCT 34.6 (L) 05/16/2021    MCV 91.5 05/16/2021     05/16/2021       Chemistry        Component Value Date/Time     05/16/2021 0605    K 4.5 05/16/2021 0605     05/16/2021 0605    CO2 25 05/16/2021 0605    BUN 12 09/08/2022 1126    CREATININE 0.88 09/08/2022 1126        Component Value Date/Time    CALCIUM 9.1 05/16/2021 0605    ALKPHOS 96 05/16/2021 0605    AST 20 05/16/2021 0605    ALT 12 05/16/2021 0605    BILITOT 0.25 (L) 05/16/2021 0605        CT CHEST W CONTRAST  Narrative: EXAMINATION:  CT OF THE CHEST WITH CONTRAST 9/8/2022 12:30 pm    TECHNIQUE:  CT of the chest was performed with the administration of intravenous  contrast. Multiplanar reformatted images are provided for review. Automated  exposure control, iterative reconstruction, and/or weight based adjustment of  the mA/kV was utilized to reduce the radiation dose to as low as reasonably  achievable. COMPARISON:  09/08/2021    HISTORY:  ORDERING SYSTEM PROVIDED HISTORY: Lung mass    FINDINGS:  Mediastinum:  Normal cardiac size. Aorta enhances normally and is normal in  caliber. The main pulmonary arteries are grossly normal.  No significant  mediastinal, hilar or axillary lymphadenopathy. Thyroid gland shows no  significant abnormalities. There is some apparent thickening of the  esophagus which could be due to collapsed nature but please correlate for  possible esophagitis. Slightly more pronounced compared to prior. Lungs/pleura: Mild centrilobular emphysema. Scattered calcified subcentimeter lung nodules. There is no significant change in solid noncalcified circumscribed smooth  margined pleural-based medial right lung apex nodule centered at the level of  T3.   Measuring on lung windows this measures 3.7 x 2.7 x 2.8 cm compared to  3.5 x 2.6 x 3.0 cm on the prior when measured in similar fashion. Some scarring in the medial aspect middle lobe along the pleura. No  significant new lung nodule or mass. Upper Abdomen: No acute process. No suspicious mass. Soft Tissues/Bones: No acute abnormality of the bones. The superficial soft  tissues show no significant abnormalities. Impression: 1. Stable pleural-based medial right lung apex mass as discussed above. 2. Mild centrilobular emphysema. 3. Scattered tiny calcified nodules representing granulomatous disease. IMPRESSION:   Right upper lobe lung mass  Bilateral parotid glands enlargement  Tobacco abuse    PLAN: I reviewed the images from February and March 2020 and explained to the patient and her . Repeated CT scans from September 9, 2020 were reviewed and discussed with the patient as well. Most recent CT scan showed no changes which is quite reassuring and suggestive of benign underlying etiology. .  Findings are generally benign with no significant suspicious abnormalities. At the present time recommendations for observation. We will see her in 12 months. We will repeat CT scans before return visit. Sooner for any problems. Unfortunately she continued to smoke. Counseled about importance of tobacco cessation. Patient's questions were answered to the best of her satisfaction and she verbalized full understanding and agreement.

## 2023-04-12 ENCOUNTER — HOSPITAL ENCOUNTER (EMERGENCY)
Age: 63
Discharge: HOME | End: 2023-04-12
Attending: EMERGENCY MEDICINE
Payer: MEDICARE

## 2023-04-12 VITALS
HEART RATE: 78 BPM | TEMPERATURE: 97.8 F | WEIGHT: 211 LBS | OXYGEN SATURATION: 95 % | SYSTOLIC BLOOD PRESSURE: 143 MMHG | BODY MASS INDEX: 36.22 KG/M2 | RESPIRATION RATE: 18 BRPM | DIASTOLIC BLOOD PRESSURE: 75 MMHG

## 2023-04-12 DIAGNOSIS — R89.9 ABNORMAL LABORATORY TEST RESULT: Primary | ICD-10-CM

## 2023-04-12 LAB
ANION GAP SERPL CALCULATED.3IONS-SCNC: 8 MMOL/L (ref 9–17)
BUN SERPL-MCNC: 14 MG/DL (ref 8–23)
BUN/CREAT BLD: 16 (ref 9–20)
CALCIUM SERPL-MCNC: 9.7 MG/DL (ref 8.6–10.4)
CHLORIDE SERPL-SCNC: 96 MMOL/L (ref 98–107)
CO2 SERPL-SCNC: 32 MMOL/L (ref 20–31)
CREAT SERPL-MCNC: 0.85 MG/DL (ref 0.5–0.9)
EKG ATRIAL RATE: 73 BPM
EKG P AXIS: 45 DEGREES
EKG P-R INTERVAL: 148 MS
EKG Q-T INTERVAL: 380 MS
EKG QRS DURATION: 82 MS
EKG QTC CALCULATION (BAZETT): 418 MS
EKG T AXIS: 52 DEGREES
EKG VENTRICULAR RATE: 73 BPM
GFR SERPL CREATININE-BSD FRML MDRD: >60 ML/MIN/1.73M2
GLUCOSE SERPL-MCNC: 130 MG/DL (ref 70–99)
POTASSIUM SERPL-SCNC: 4.2 MMOL/L (ref 3.7–5.3)
SODIUM SERPL-SCNC: 136 MMOL/L (ref 135–144)

## 2023-04-12 PROCEDURE — 36415 COLL VENOUS BLD VENIPUNCTURE: CPT

## 2023-04-12 PROCEDURE — 80048 BASIC METABOLIC PNL TOTAL CA: CPT

## 2023-04-12 PROCEDURE — 93005 ELECTROCARDIOGRAM TRACING: CPT | Performed by: EMERGENCY MEDICINE

## 2023-04-12 PROCEDURE — 93010 ELECTROCARDIOGRAM REPORT: CPT | Performed by: INTERNAL MEDICINE

## 2023-04-12 PROCEDURE — 99284 EMERGENCY DEPT VISIT MOD MDM: CPT

## 2023-04-12 ASSESSMENT — PAIN - FUNCTIONAL ASSESSMENT
PAIN_FUNCTIONAL_ASSESSMENT: NONE - DENIES PAIN
PAIN_FUNCTIONAL_ASSESSMENT: NONE - DENIES PAIN

## 2023-04-12 NOTE — ED PROVIDER NOTES
[address]      Pt Name: Noelle Martinez  MRN: [id number]  [place] 1960  Date of evaluation: 4/12/2023  Provider: Estephania Rabago MD    CHIEF COMPLAINT       Chief Complaint   Patient presents with    Abnormal Lab     Sent by pcp for abnormal labs, had blood work done Monday patient reports potassium being 6.6         HISTORY OF PRESENT ILLNESS      Noelle Martinez is a 58 y.o. female who presents to the emergency department for evaluation of reported hyperkalemia. Had routine labs drawn at her PCPs office 2 days ago and was told to come back here today because her potassium was 6.6. She has no complaints at this time. Specifically, she denies any fatigue, nausea, vomiting, chest pain, dyspnea or abdominal pain. No  complaints. No history of hyperkalemia.     PAST MEDICAL HISTORY     Past Medical History:   Diagnosis Date    ADHD (attention deficit hyperactivity disorder)     Anxiety     Back pain     Chronic dental pain     Hypertension          SURGICAL HISTORY       Past Surgical History:   Procedure Laterality Date    ANKLE CLOSED REDUCTION Left 05/15/2021    Dr Dillon Shoulder Left 5/15/2021    LEFT ANKLE CLOSED REDUCTION WITH SPLINT PLACEMENT performed by Thai Bello MD at [address] [place] [address] SURGERY      TONSILLECTOMY      TUBAL LIGATION           CURRENT MEDICATIONS       Previous Medications    ALBUTEROL (ACCUNEB) 1.25 MG/3ML NEBULIZER SOLUTION    Inhale 1 ampule into the lungs every 6 hours as needed for Wheezing    BUPROPION (WELLBUTRIN SR) 150 MG EXTENDED RELEASE TABLET    Take 150 mg by mouth 3 times daily     CETIRIZINE HCL (ZYRTEC ALLERGY PO)    Take by mouth    ENOXAPARIN (LOVENOX) 40 MG/0.4ML INJECTION    Inject 0.4 mLs into the skin daily    FLUTICASONE (FLONASE) 50 MCG/ACT NASAL SPRAY    1 spray by Each Nostril route 2 times daily at 0800 and 1400 Patient

## 2023-04-12 NOTE — DISCHARGE INSTRUCTIONS
Your potassium level is normal today. There is no need for further ED evaluation.     Please follow-up with your PCP as previously arranged

## 2023-05-05 ENCOUNTER — HOSPITAL ENCOUNTER (OUTPATIENT)
Dept: MAMMOGRAPHY | Age: 63
End: 2023-05-05
Payer: MEDICARE

## 2023-05-05 DIAGNOSIS — Z12.31 ENCOUNTER FOR SCREENING MAMMOGRAM FOR MALIGNANT NEOPLASM OF BREAST: ICD-10-CM

## 2023-05-05 PROCEDURE — 77063 BREAST TOMOSYNTHESIS BI: CPT

## 2023-05-30 ENCOUNTER — TELEPHONE (OUTPATIENT)
Dept: GASTROENTEROLOGY | Age: 63
End: 2023-05-30

## 2023-05-30 NOTE — TELEPHONE ENCOUNTER
LVM for patient to call and set up appointment for screening colonoscopy. Referral from PCP, scanned in.

## 2023-07-11 ENCOUNTER — OFFICE VISIT (OUTPATIENT)
Dept: GASTROENTEROLOGY | Age: 63
End: 2023-07-11
Payer: MEDICARE

## 2023-07-11 VITALS
HEIGHT: 64 IN | RESPIRATION RATE: 18 BRPM | HEART RATE: 80 BPM | SYSTOLIC BLOOD PRESSURE: 113 MMHG | WEIGHT: 207.7 LBS | DIASTOLIC BLOOD PRESSURE: 72 MMHG | BODY MASS INDEX: 35.46 KG/M2 | OXYGEN SATURATION: 98 %

## 2023-07-11 DIAGNOSIS — K62.5 RECTAL BLEEDING: Primary | ICD-10-CM

## 2023-07-11 DIAGNOSIS — R19.5 POSITIVE COLORECTAL CANCER SCREENING USING COLOGUARD TEST: ICD-10-CM

## 2023-07-11 PROBLEM — Z01.419 ENCOUNTER FOR ANNUAL ROUTINE GYNECOLOGICAL EXAMINATION: Status: ACTIVE | Noted: 2019-10-15

## 2023-07-11 PROBLEM — M25.569 KNEE PAIN: Status: ACTIVE | Noted: 2021-02-24

## 2023-07-11 PROBLEM — E66.3 OVERWEIGHT: Status: ACTIVE | Noted: 2019-07-11

## 2023-07-11 PROBLEM — R22.1 LOCALIZED SWELLING, MASS AND LUMP, NECK: Status: ACTIVE | Noted: 2020-01-29

## 2023-07-11 PROBLEM — Z13.1 ENCOUNTER FOR SCREENING FOR DIABETES MELLITUS: Status: ACTIVE | Noted: 2019-07-11

## 2023-07-11 PROCEDURE — 3078F DIAST BP <80 MM HG: CPT | Performed by: NURSE PRACTITIONER

## 2023-07-11 PROCEDURE — 99202 OFFICE O/P NEW SF 15 MIN: CPT | Performed by: NURSE PRACTITIONER

## 2023-07-11 PROCEDURE — 3074F SYST BP LT 130 MM HG: CPT | Performed by: NURSE PRACTITIONER

## 2023-07-11 RX ORDER — POLYETHYLENE GLYCOL 3350, SODIUM SULFATE ANHYDROUS, SODIUM BICARBONATE, SODIUM CHLORIDE, POTASSIUM CHLORIDE 236; 22.74; 6.74; 5.86; 2.97 G/4L; G/4L; G/4L; G/4L; G/4L
4 POWDER, FOR SOLUTION ORAL ONCE
Qty: 4000 ML | Refills: 0 | Status: SHIPPED | OUTPATIENT
Start: 2023-07-11 | End: 2023-07-11

## 2023-07-11 RX ORDER — NYSTATIN 100000 [USP'U]/G
POWDER TOPICAL
COMMUNITY
Start: 2023-04-10

## 2023-07-11 RX ORDER — SIMVASTATIN 20 MG
20 TABLET ORAL
COMMUNITY
End: 2023-07-11

## 2023-07-11 ASSESSMENT — ENCOUNTER SYMPTOMS
RESPIRATORY NEGATIVE: 1
ALLERGIC/IMMUNOLOGIC NEGATIVE: 1
EYES NEGATIVE: 1
TROUBLE SWALLOWING: 0
ANAL BLEEDING: 1

## 2023-07-11 NOTE — PROGRESS NOTES
clubbing or edema. Musculoskeletal: normal range of motion, no joint swelling, deformity or tenderness. Neurologic:  no cranial nerve deficit, gait, coordination and speech normal.      Lab Results   Component Value Date    WBC 7.8 05/16/2021    HGB 11.4 (L) 05/16/2021    HCT 34.6 (L) 05/16/2021    MCV 91.5 05/16/2021     05/16/2021        Lab Results   Component Value Date     04/12/2023    K 4.2 04/12/2023    CL 96 (L) 04/12/2023    CO2 32 (H) 04/12/2023    BUN 14 04/12/2023    CREATININE 0.85 04/12/2023    GLUCOSE 130 (H) 04/12/2023    CALCIUM 9.7 04/12/2023    PROT 5.7 (L) 05/16/2021    LABALBU 3.7 05/16/2021    BILITOT 0.25 (L) 05/16/2021    ALKPHOS 96 05/16/2021    AST 20 05/16/2021    ALT 12 05/16/2021    LABGLOM >60 04/12/2023    GFRAA >60 09/08/2022          Lab Results   Component Value Date    INR 1.0 05/14/2021    PROTIME 12.9 05/14/2021       CT Result (most recent):  CT CHEST W CONTRAST 09/08/2022    Narrative  EXAMINATION:  CT OF THE CHEST WITH CONTRAST 9/8/2022 12:30 pm    TECHNIQUE:  CT of the chest was performed with the administration of intravenous  contrast. Multiplanar reformatted images are provided for review. Automated  exposure control, iterative reconstruction, and/or weight based adjustment of  the mA/kV was utilized to reduce the radiation dose to as low as reasonably  achievable. COMPARISON:  09/08/2021    HISTORY:  ORDERING SYSTEM PROVIDED HISTORY: Lung mass    FINDINGS:  Mediastinum:  Normal cardiac size. Aorta enhances normally and is normal in  caliber. The main pulmonary arteries are grossly normal.  No significant  mediastinal, hilar or axillary lymphadenopathy. Thyroid gland shows no  significant abnormalities. There is some apparent thickening of the  esophagus which could be due to collapsed nature but please correlate for  possible esophagitis. Slightly more pronounced compared to prior. Lungs/pleura: Mild centrilobular emphysema.     Scattered

## 2023-07-11 NOTE — PATIENT INSTRUCTIONS
SURVEY:    You may be receiving a survey from "[employer]," regarding your visit today. You may get this in the mail, through your MyChart, or in your email. Please complete the survey to enable us to provide the highest quality of care to you and your family. If you cannot score us a very good (5 Stars) on any question, please call the office to discuss how we could of made your experience exceptional.    Thank you!     MD Destiny Scott, APRN-CAPRI Davis LPN    Phone: [phone]  Fax: [phone]    Office Hours:   M-TH 8-5, F: 8-12

## 2023-07-28 NOTE — PROGRESS NOTES
Patient states they received their endoscopy prep instructions from the physician's office. Patient states they understand medications to be taken with sip of water only the a.m. of procedure. Results of bowel prep reviewed with patient, appearance should be clear, yellow, liquid prior to arrival at facility. Patient uses medical marijuana daily and states she will not be able to go without for 5 days. Encouraged patient to be up-front with anesthesia provider so that they are able to keep patient safe throughout procedure, patient verbalizes understanding.

## 2023-08-10 PROBLEM — Z13.1 ENCOUNTER FOR SCREENING FOR DIABETES MELLITUS: Status: RESOLVED | Noted: 2019-07-11 | Resolved: 2023-08-10

## 2023-08-10 PROBLEM — Z01.419 ENCOUNTER FOR ANNUAL ROUTINE GYNECOLOGICAL EXAMINATION: Status: RESOLVED | Noted: 2019-10-15 | Resolved: 2023-08-10

## 2023-08-14 ENCOUNTER — ANESTHESIA EVENT (OUTPATIENT)
Dept: OPERATING ROOM | Age: 63
End: 2023-08-14
Payer: MEDICARE

## 2023-08-15 ENCOUNTER — ANESTHESIA (OUTPATIENT)
Dept: OPERATING ROOM | Age: 63
End: 2023-08-15
Payer: MEDICARE

## 2023-08-15 ENCOUNTER — HOSPITAL ENCOUNTER (OUTPATIENT)
Age: 63
Setting detail: OUTPATIENT SURGERY
Discharge: HOME | End: 2023-08-15
Attending: INTERNAL MEDICINE | Admitting: INTERNAL MEDICINE
Payer: MEDICARE

## 2023-08-15 VITALS
BODY MASS INDEX: 34.29 KG/M2 | TEMPERATURE: 97.2 F | SYSTOLIC BLOOD PRESSURE: 143 MMHG | HEART RATE: 80 BPM | OXYGEN SATURATION: 96 % | HEIGHT: 65 IN | RESPIRATION RATE: 16 BRPM | WEIGHT: 205.8 LBS | DIASTOLIC BLOOD PRESSURE: 83 MMHG

## 2023-08-15 DIAGNOSIS — R19.5 POSITIVE COLORECTAL CANCER SCREENING USING COLOGUARD TEST: ICD-10-CM

## 2023-08-15 DIAGNOSIS — Z12.11 SCREEN FOR COLON CANCER: ICD-10-CM

## 2023-08-15 PROCEDURE — 7100000010 HC PHASE II RECOVERY - FIRST 15 MIN: Performed by: INTERNAL MEDICINE

## 2023-08-15 PROCEDURE — 3700000000 HC ANESTHESIA ATTENDED CARE: Performed by: INTERNAL MEDICINE

## 2023-08-15 PROCEDURE — 2709999900 HC NON-CHARGEABLE SUPPLY: Performed by: INTERNAL MEDICINE

## 2023-08-15 PROCEDURE — 3609010600 HC COLONOSCOPY POLYPECTOMY SNARE/COLD BIOPSY: Performed by: INTERNAL MEDICINE

## 2023-08-15 PROCEDURE — 45385 COLONOSCOPY W/LESION REMOVAL: CPT | Performed by: INTERNAL MEDICINE

## 2023-08-15 PROCEDURE — 45380 COLONOSCOPY AND BIOPSY: CPT | Performed by: INTERNAL MEDICINE

## 2023-08-15 PROCEDURE — 3700000001 HC ADD 15 MINUTES (ANESTHESIA): Performed by: INTERNAL MEDICINE

## 2023-08-15 PROCEDURE — 2580000003 HC RX 258: Performed by: NURSE ANESTHETIST, CERTIFIED REGISTERED

## 2023-08-15 PROCEDURE — 7100000011 HC PHASE II RECOVERY - ADDTL 15 MIN: Performed by: INTERNAL MEDICINE

## 2023-08-15 PROCEDURE — 3609010400 HC COLONOSCOPY POLYPECTOMY HOT BIOPSY: Performed by: INTERNAL MEDICINE

## 2023-08-15 PROCEDURE — 88305 TISSUE EXAM BY PATHOLOGIST: CPT

## 2023-08-15 PROCEDURE — 6360000002 HC RX W HCPCS: Performed by: NURSE ANESTHETIST, CERTIFIED REGISTERED

## 2023-08-15 PROCEDURE — 2500000003 HC RX 250 WO HCPCS: Performed by: NURSE ANESTHETIST, CERTIFIED REGISTERED

## 2023-08-15 RX ORDER — SODIUM CHLORIDE 9 MG/ML
INJECTION, SOLUTION INTRAVENOUS PRN
Status: DISCONTINUED | OUTPATIENT
Start: 2023-08-15 | End: 2023-08-15 | Stop reason: HOSPADM

## 2023-08-15 RX ORDER — SODIUM CHLORIDE 0.9 % (FLUSH) 0.9 %
5-40 SYRINGE (ML) INJECTION PRN
Status: DISCONTINUED | OUTPATIENT
Start: 2023-08-15 | End: 2023-08-15 | Stop reason: HOSPADM

## 2023-08-15 RX ORDER — LIDOCAINE HYDROCHLORIDE 20 MG/ML
INJECTION, SOLUTION EPIDURAL; INFILTRATION; INTRACAUDAL; PERINEURAL PRN
Status: DISCONTINUED | OUTPATIENT
Start: 2023-08-15 | End: 2023-08-15 | Stop reason: SDUPTHER

## 2023-08-15 RX ORDER — SODIUM CHLORIDE 0.9 % (FLUSH) 0.9 %
5-40 SYRINGE (ML) INJECTION EVERY 12 HOURS SCHEDULED
Status: DISCONTINUED | OUTPATIENT
Start: 2023-08-15 | End: 2023-08-15 | Stop reason: HOSPADM

## 2023-08-15 RX ORDER — PROPOFOL 10 MG/ML
INJECTION, EMULSION INTRAVENOUS CONTINUOUS PRN
Status: DISCONTINUED | OUTPATIENT
Start: 2023-08-15 | End: 2023-08-15 | Stop reason: SDUPTHER

## 2023-08-15 RX ORDER — SODIUM CHLORIDE, SODIUM LACTATE, POTASSIUM CHLORIDE, CALCIUM CHLORIDE 600; 310; 30; 20 MG/100ML; MG/100ML; MG/100ML; MG/100ML
INJECTION, SOLUTION INTRAVENOUS CONTINUOUS
Status: DISCONTINUED | OUTPATIENT
Start: 2023-08-15 | End: 2023-08-15 | Stop reason: HOSPADM

## 2023-08-15 RX ADMIN — LIDOCAINE HYDROCHLORIDE 5 ML: 20 INJECTION, SOLUTION EPIDURAL; INFILTRATION; INTRACAUDAL; PERINEURAL at 11:33

## 2023-08-15 RX ADMIN — SODIUM CHLORIDE, POTASSIUM CHLORIDE, SODIUM LACTATE AND CALCIUM CHLORIDE: 600; 310; 30; 20 INJECTION, SOLUTION INTRAVENOUS at 10:48

## 2023-08-15 RX ADMIN — PROPOFOL 250 MCG/KG/MIN: 10 INJECTION, EMULSION INTRAVENOUS at 11:33

## 2023-08-15 ASSESSMENT — LIFESTYLE VARIABLES: SMOKING_STATUS: 1

## 2023-08-15 ASSESSMENT — COPD QUESTIONNAIRES: CAT_SEVERITY: MILD

## 2023-08-15 NOTE — PROGRESS NOTES
Patient verbalizes readiness for discharge at this time. Discharge Criteria    Inpatients must meet Criteria 1 through 7. All other patients are either YES or N/A. If a NO is chosen then Anesthesia or Surgeon must be notified. 1.  Minimum 30 minutes after last dose of sedative medication. Yes      2. Systolic BP between 90 - 347. Diastolic BP between 60 - 90. Yes      3. Pulse between 60 - 120    Yes      4. Respirations between 8 - 25. Yes      5. SpO2 92% - 100%. Yes      6. Able to cough and swallow or return to baseline function. Yes      7. Alert and oriented or return to baseline mental status. Yes      8. Demonstrates controlled, coordinated movements, ambulates with steady gait, or return to baseline activity function. Yes      9. Minimal or no pain or nausea, or at a level tolerable and acceptable to patient. Yes      10. Takes and retains oral fluids as allowed. Yes      11. Procedural / perioperative site stable. Minimal or no bleeding. Yes          12. If GI endoscopy procedure, minimal or no abdominal distention or passing flatus. Yes      13. Written discharge instructions and emergency telephone number provided. Yes      14. Accompanied by a responsible adult.     Yes

## 2023-08-15 NOTE — H&P
History and Physical    Patient's Name/Date of Birth: Jessica Moon / 1960 (55 y.o.)    MRN: [id number]     Date: August 15, 2023       CHIEF COMPLAINT:  screening for colon cancer      HPI Jessica Moon is a 58 y.o. old female who has a past medical history of anxiety, hypertension, hyperlipidemia, ADHD, lung mass (follows with oncology yearly), presenting for her initial colonoscopy after positive Cologuard. Family history of colon cancer: No  Blood in stool: Yes: Spotting noticed on toilet paper. Feels it is due to hemorrhoids. Unintentional weight loss: No  Abdominal pain: No  Prior colonoscopy: No  Constipation history: No  Number of bowel movements a day: 1-2  Change in stool caliber: No  History of GERD or Acid Reflux: No  Use of PPI's.  No  Past Medical History:   Diagnosis Date    ADHD (attention deficit hyperactivity disorder)     Anxiety     Back pain     Chronic dental pain     Hypertension      Past Surgical History:   Procedure Laterality Date    ANKLE CLOSED REDUCTION Left 05/15/2021    Dr Donna Rhodes Left 5/15/2021    LEFT ANKLE CLOSED REDUCTION WITH SPLINT PLACEMENT performed by Burman Frankel, MD at [facility] SURGERY      TONSILLECTOMY      TUBAL LIGATION       Current Facility-Administered Medications   Medication Dose Route Frequency Provider Last Rate Last Admin    lactated ringers IV soln infusion   IntraVENous Continuous Majo Manohar, APRN - CRNA 100 mL/hr at 08/15/23 1048 New Bag at 08/15/23 1048     Allergies   Allergen Reactions    Dust Mite Extract     Molds & Smuts     Shellfish Allergy Nausea Only    Trazodone     Adhesive Tape Rash     Family History   Problem Relation Age of Onset    Breast Cancer Sister 76     Social History     Socioeconomic History    Marital status:      Spouse name: Not on file    Number of children: 1    Years of education: Not on file    Highest education

## 2023-08-15 NOTE — OP NOTE
13 lesions - 2 were sessile and 11 were polypoid. Ascending colon: 4 mm polypoid polyp removed with cold biopsy forceps. Sigmoid colon: 8mm, 8mm sessile polyps with central indentations were removed with a hot snare. Rectum: 8mm, 8mm. 7mm, 6mm polypoid lesions- removed with a hot snare; 5mm, 5mm, 5mm polypoid lesions removed with cold biopsy forceps. Retroflexion was performed in the rectum and moderate internal hemorrhoids were noted. Withdrawal time was 23 minutes. IMPRESSION:   11 polyps  Internal hemorrhoids     RECOMMENDATIONS:   1) Discussed avoidance of all NSAIDs for the next 30 days  2) Repeat Colonoscopy in 3 years or sooner.  Await pathology      Electronically signed by Sailaja Miles MD on 8/15/2023 at 12:45 PM

## 2023-08-15 NOTE — ANESTHESIA POSTPROCEDURE EVALUATION
Department of Anesthesiology  Postprocedure Note    Patient: Grace Rodriguez  MRN: [id number]  [id number] [facility] [address] 1960  Date of evaluation: 8/15/2023      Procedure Summary     Date: 08/15/23 Room / Location: [address] [place] [address]    Anesthesia Start: 8543 Anesthesia Stop: 1214    Procedures:       COLONOSCOPY POLYPECTOMY SNARE/COLD BIOPSY      COLONOSCOPY POLYPECTOMY HOT BIOPSY Diagnosis:       Screen for colon cancer      Positive colorectal cancer screening using Cologuard test      (Screen for colon cancer [Z12.11])      (Positive colorectal cancer screening using Cologuard test [R19.5])    Surgeons: Barnetta Phalen, MD Responsible Provider: OJ Baird CRNA    Anesthesia Type: general, TIVA ASA Status: 3          Anesthesia Type: No value filed.     Jomar Phase I: Jomar Score: 10    Jomar Phase II: Jomar Score: 10      Anesthesia Post Evaluation    Patient location during evaluation: PACU  Patient participation: complete - patient participated  Level of consciousness: awake  Airway patency: patent  Nausea & Vomiting: no vomiting and no nausea  Complications: no  Cardiovascular status: blood pressure returned to baseline and hemodynamically stable  Respiratory status: acceptable, spontaneous ventilation and room air  Hydration status: stable  Pain management: satisfactory to patient

## 2023-08-15 NOTE — ANESTHESIA POSTPROCEDURE EVALUATION
Department of Anesthesiology  Postprocedure Note    Patient: Alease Soulier  MRN: [id number]  [id number] [facility] [address] [facility] 1960  Date of evaluation: 8/15/2023      Procedure Summary     Date: 08/15/23 Room / Location: [address] [place] [address]    Anesthesia Start: 5397 Anesthesia Stop: 1214    Procedures:       COLONOSCOPY POLYPECTOMY SNARE/COLD BIOPSY      COLONOSCOPY POLYPECTOMY HOT BIOPSY Diagnosis:       Screen for colon cancer      Positive colorectal cancer screening using Cologuard test      (Screen for colon cancer [Z12.11])      (Positive colorectal cancer screening using Cologuard test [R19.5])    Surgeons: Singh Durand MD Responsible Provider: OJ Rooney CRNA    Anesthesia Type: general, TIVA ASA Status: 3          Anesthesia Type: No value filed.     Jomar Phase I: Jomar Score: 10    Jomar Phase II: Jomar Score: 10      Anesthesia Post Evaluation    Patient location during evaluation: PACU  Patient participation: complete - patient participated  Level of consciousness: awake and alert  Airway patency: patent  Nausea & Vomiting: no nausea and no vomiting  Complications: no  Cardiovascular status: blood pressure returned to baseline and hemodynamically stable  Respiratory status: acceptable and room air  Hydration status: euvolemic  Pain management: adequate

## 2023-08-18 LAB — SURGICAL PATHOLOGY REPORT: NORMAL

## 2023-08-21 ENCOUNTER — TELEPHONE (OUTPATIENT)
Dept: GASTROENTEROLOGY | Age: 63
End: 2023-08-21

## 2023-08-21 NOTE — TELEPHONE ENCOUNTER
----- Message from Myla Saez MD sent at 8/20/2023  8:04 PM EDT -----  Please notify patient: Tubular adenomas are on a pre-cancerous spectrum. Hyperplastic polyps are polyps which are not on a pre-cancerous spectrum. No cancer was reported. Repeat interval for colonoscopy is 3 years.

## 2023-09-08 ENCOUNTER — HOSPITAL ENCOUNTER (OUTPATIENT)
Age: 63
Discharge: HOME | End: 2023-09-08
Payer: MEDICARE

## 2023-09-08 ENCOUNTER — HOSPITAL ENCOUNTER (OUTPATIENT)
Dept: CT IMAGING | Age: 63
End: 2023-09-08
Payer: MEDICARE

## 2023-09-08 DIAGNOSIS — D49.0 PAROTID TUMOR: ICD-10-CM

## 2023-09-08 DIAGNOSIS — R91.8 LUNG MASS: ICD-10-CM

## 2023-09-08 LAB
BUN SERPL-MCNC: 12 MG/DL (ref 8–23)
CREAT SERPL-MCNC: 1 MG/DL (ref 0.5–0.9)
GFR SERPL CREATININE-BSD FRML MDRD: >60 ML/MIN/1.73M2

## 2023-09-08 PROCEDURE — 6360000004 HC RX CONTRAST MEDICATION: Performed by: INTERNAL MEDICINE

## 2023-09-08 PROCEDURE — 82565 ASSAY OF CREATININE: CPT

## 2023-09-08 PROCEDURE — 36415 COLL VENOUS BLD VENIPUNCTURE: CPT

## 2023-09-08 PROCEDURE — 71260 CT THORAX DX C+: CPT

## 2023-09-08 PROCEDURE — 84520 ASSAY OF UREA NITROGEN: CPT

## 2023-09-08 RX ADMIN — IOPAMIDOL 75 ML: 755 INJECTION, SOLUTION INTRAVENOUS at 11:36

## 2023-09-13 ENCOUNTER — OFFICE VISIT (OUTPATIENT)
Dept: ONCOLOGY | Age: 63
End: 2023-09-13
Payer: MEDICARE

## 2023-09-13 VITALS
BODY MASS INDEX: 35.34 KG/M2 | RESPIRATION RATE: 18 BRPM | WEIGHT: 207 LBS | TEMPERATURE: 97.1 F | HEART RATE: 71 BPM | HEIGHT: 64 IN | SYSTOLIC BLOOD PRESSURE: 106 MMHG | DIASTOLIC BLOOD PRESSURE: 64 MMHG

## 2023-09-13 DIAGNOSIS — R91.8 LUNG MASS: Primary | ICD-10-CM

## 2023-09-13 PROCEDURE — 99214 OFFICE O/P EST MOD 30 MIN: CPT | Performed by: INTERNAL MEDICINE

## 2023-09-13 PROCEDURE — 3078F DIAST BP <80 MM HG: CPT | Performed by: INTERNAL MEDICINE

## 2023-09-13 PROCEDURE — 3074F SYST BP LT 130 MM HG: CPT | Performed by: INTERNAL MEDICINE

## 2023-09-13 NOTE — PROGRESS NOTES
_     Chief Complaint   Patient presents with    Follow-up     Lung mass     DIAGNOSIS:       Right upper lobe lung mass  Bilateral parotid glands enlargement  Tobacco abuse      CURRENT THERAPY:         Observation and active surveillance. BRIEF CASE HISTORY:      Ms. Nataly Cline is a very pleasant 58 y.o. female with history of multiple co morbidities as listed. The patient is referred for evaluation of right upper lobe lung mass and parotid gland and possible lung tumor. Patient had feeling of enlarged bilateral submandibular areas for 1 to 2 years. She started noticing some painful areas about 8 months ago. For evaluation of the submandibular symptoms patient had soft tissue neck CT scan. Kulwinder Polo She was noted to have slightly enlarged parotids but also incidentally discovered right upper lobe oval mass. CT scan of the chest March 9 showed same findings. Patient was referred for further evaluation however due to coronavirus pandemic she was not seen until today. Patient has no chest pain. No cough, sputum or hemoptysis. No weight loss or decreased appetite. No fever or night sweats. No enlarged lymph nodes. Patient smokes half pack per day for the last 40 years. Denies alcohol drinking. INTERIM HISTORY:   Patient is seen for follow-up enlarged submandibular glands and lung mass. Patient had CT scan of the chest and CT scan of the soft tissue neck which were suggestive of benign findings. Patient continues to smoke. She smokes about 8 to 10 cigarettes a day. Counseled about importance of tobacco cessation. Patient has occasional cough. No chest pain. No hemoptysis. She continues to feel the lumps in the parotid areas. No difficulty swallowing. No other lumps or masses.         PAST MEDICAL HISTORY: has a past medical history of ADHD (attention deficit hyperactivity disorder), Anxiety, Back pain, Chronic

## 2023-11-17 ENCOUNTER — HOSPITAL ENCOUNTER (OUTPATIENT)
Age: 63
Setting detail: SPECIMEN
Discharge: HOME | End: 2023-11-17

## 2023-11-21 LAB — DERMATOLOGY PATHOLOGY REPORT: NORMAL

## 2024-05-29 ENCOUNTER — HOSPITAL ENCOUNTER (OUTPATIENT)
Dept: WOMENS IMAGING | Age: 64
Discharge: HOME | End: 2024-05-31
Payer: MEDICARE

## 2024-05-29 DIAGNOSIS — Z12.31 SCREENING MAMMOGRAM FOR BREAST CANCER: ICD-10-CM

## 2024-05-29 PROCEDURE — 77063 BREAST TOMOSYNTHESIS BI: CPT

## 2024-06-06 ENCOUNTER — HOSPITAL ENCOUNTER (OUTPATIENT)
Age: 64
Discharge: HOME | End: 2024-06-06
Payer: MEDICARE

## 2024-06-06 LAB
ANION GAP SERPL CALCULATED.3IONS-SCNC: 7 MMOL/L (ref 9–17)
BASOPHILS # BLD: 0.03 K/UL (ref 0–0.2)
BASOPHILS NFR BLD: 1 % (ref 0–2)
BUN SERPL-MCNC: 9 MG/DL (ref 8–23)
BUN/CREAT SERPL: 10 (ref 9–20)
CALCIUM SERPL-MCNC: 9.6 MG/DL (ref 8.6–10.4)
CHLORIDE SERPL-SCNC: 100 MMOL/L (ref 98–107)
CO2 SERPL-SCNC: 33 MMOL/L (ref 20–31)
CREAT SERPL-MCNC: 0.9 MG/DL (ref 0.5–0.9)
CRP SERPL HS-MCNC: <3 MG/L (ref 0–5)
EOSINOPHIL # BLD: 0.15 K/UL (ref 0–0.44)
EOSINOPHILS RELATIVE PERCENT: 2 % (ref 1–4)
ERYTHROCYTE [DISTWIDTH] IN BLOOD BY AUTOMATED COUNT: 12.4 % (ref 11.8–14.4)
ERYTHROCYTE [SEDIMENTATION RATE] IN BLOOD BY PHOTOMETRIC METHOD: 3 MM/HR (ref 0–30)
GFR, ESTIMATED: 71 ML/MIN/1.73M2
GLUCOSE SERPL-MCNC: 115 MG/DL (ref 70–99)
HCT VFR BLD AUTO: 46 % (ref 36.3–47.1)
HGB BLD-MCNC: 15.3 G/DL (ref 11.9–15.1)
IMM GRANULOCYTES # BLD AUTO: <0.03 K/UL (ref 0–0.3)
IMM GRANULOCYTES NFR BLD: 0 %
LYMPHOCYTES NFR BLD: 1.84 K/UL (ref 1.1–3.7)
LYMPHOCYTES RELATIVE PERCENT: 28 % (ref 24–43)
MCH RBC QN AUTO: 30 PG (ref 25.2–33.5)
MCHC RBC AUTO-ENTMCNC: 33.3 G/DL (ref 28.4–34.8)
MCV RBC AUTO: 90.2 FL (ref 82.6–102.9)
MONOCYTES NFR BLD: 0.53 K/UL (ref 0.1–1.2)
MONOCYTES NFR BLD: 8 % (ref 3–12)
NEUTROPHILS NFR BLD: 61 % (ref 36–65)
NEUTS SEG NFR BLD: 4.06 K/UL (ref 1.5–8.1)
NRBC BLD-RTO: 0 PER 100 WBC
PLATELET # BLD AUTO: 253 K/UL (ref 138–453)
PMV BLD AUTO: 11.1 FL (ref 8.1–13.5)
POTASSIUM SERPL-SCNC: 4.6 MMOL/L (ref 3.7–5.3)
RBC # BLD AUTO: 5.1 M/UL (ref 3.95–5.11)
SODIUM SERPL-SCNC: 140 MMOL/L (ref 135–144)
WBC OTHER # BLD: 6.6 K/UL (ref 3.5–11.3)

## 2024-06-06 PROCEDURE — 85025 COMPLETE CBC W/AUTO DIFF WBC: CPT

## 2024-06-06 PROCEDURE — 36415 COLL VENOUS BLD VENIPUNCTURE: CPT

## 2024-06-06 PROCEDURE — 85652 RBC SED RATE AUTOMATED: CPT

## 2024-06-06 PROCEDURE — 80048 BASIC METABOLIC PNL TOTAL CA: CPT

## 2024-06-06 PROCEDURE — 86140 C-REACTIVE PROTEIN: CPT

## 2024-07-16 ENCOUNTER — HOSPITAL ENCOUNTER (OUTPATIENT)
Dept: GENERAL RADIOLOGY | Age: 64
Discharge: HOME | End: 2024-07-18
Payer: MEDICARE

## 2024-07-16 ENCOUNTER — HOSPITAL ENCOUNTER (OUTPATIENT)
Age: 64
Discharge: HOME | End: 2024-07-16
Payer: MEDICARE

## 2024-07-16 ENCOUNTER — HOSPITAL ENCOUNTER (OUTPATIENT)
Age: 64
Discharge: HOME | End: 2024-07-18
Payer: MEDICARE

## 2024-07-16 DIAGNOSIS — Z01.818 PREOP EXAMINATION: ICD-10-CM

## 2024-07-16 DIAGNOSIS — Z96.9 PRESENCE OF RETAINED HARDWARE: ICD-10-CM

## 2024-07-16 LAB
EKG ATRIAL RATE: 72 BPM
EKG P AXIS: 62 DEGREES
EKG P-R INTERVAL: 150 MS
EKG Q-T INTERVAL: 400 MS
EKG QRS DURATION: 84 MS
EKG QTC CALCULATION (BAZETT): 438 MS
EKG R AXIS: 16 DEGREES
EKG T AXIS: 52 DEGREES
EKG VENTRICULAR RATE: 72 BPM

## 2024-07-16 PROCEDURE — 71046 X-RAY EXAM CHEST 2 VIEWS: CPT

## 2024-07-16 PROCEDURE — 93010 ELECTROCARDIOGRAM REPORT: CPT | Performed by: INTERNAL MEDICINE

## 2024-07-16 PROCEDURE — 93005 ELECTROCARDIOGRAM TRACING: CPT | Performed by: ORTHOPAEDIC SURGERY

## 2024-09-05 ENCOUNTER — HOSPITAL ENCOUNTER (OUTPATIENT)
Dept: CT IMAGING | Age: 64
Discharge: HOME | End: 2024-09-07
Attending: INTERNAL MEDICINE
Payer: MEDICARE

## 2024-09-05 DIAGNOSIS — R91.8 LUNG MASS: ICD-10-CM

## 2024-09-05 PROCEDURE — 71250 CT THORAX DX C-: CPT

## 2024-09-11 ENCOUNTER — OFFICE VISIT (OUTPATIENT)
Dept: ONCOLOGY | Age: 64
End: 2024-09-11
Payer: MEDICARE

## 2024-09-11 VITALS
RESPIRATION RATE: 18 BRPM | HEART RATE: 78 BPM | BODY MASS INDEX: 36.22 KG/M2 | DIASTOLIC BLOOD PRESSURE: 76 MMHG | WEIGHT: 211 LBS | TEMPERATURE: 97.7 F | SYSTOLIC BLOOD PRESSURE: 128 MMHG

## 2024-09-11 DIAGNOSIS — R91.8 LUNG MASS: Primary | ICD-10-CM

## 2024-09-11 PROCEDURE — 99214 OFFICE O/P EST MOD 30 MIN: CPT | Performed by: INTERNAL MEDICINE

## 2024-09-11 PROCEDURE — 3074F SYST BP LT 130 MM HG: CPT | Performed by: INTERNAL MEDICINE

## 2024-09-11 PROCEDURE — 1123F ACP DISCUSS/DSCN MKR DOCD: CPT | Performed by: INTERNAL MEDICINE

## 2024-09-11 PROCEDURE — 3078F DIAST BP <80 MM HG: CPT | Performed by: INTERNAL MEDICINE

## 2025-06-11 ENCOUNTER — HOSPITAL ENCOUNTER (OUTPATIENT)
Dept: WOMENS IMAGING | Age: 65
Discharge: HOME | End: 2025-06-13
Payer: MEDICARE

## 2025-06-11 VITALS — WEIGHT: 210 LBS | HEIGHT: 64 IN | BODY MASS INDEX: 35.85 KG/M2

## 2025-06-11 DIAGNOSIS — Z12.39 SCREENING BREAST EXAMINATION: ICD-10-CM

## 2025-06-11 PROCEDURE — 77063 BREAST TOMOSYNTHESIS BI: CPT

## 2025-09-04 ENCOUNTER — HOSPITAL ENCOUNTER (OUTPATIENT)
Dept: CT IMAGING | Age: 65
Discharge: HOME | End: 2025-09-06
Attending: INTERNAL MEDICINE
Payer: MEDICARE

## 2025-09-04 DIAGNOSIS — R91.8 LUNG MASS: ICD-10-CM

## 2025-09-04 PROCEDURE — 71250 CT THORAX DX C-: CPT

## (undated) DEVICE — PAD ADH AD ELECTRD 2 PLT W 5M CRD

## (undated) DEVICE — BANDAGE COMPR M W4INXL10YD WHT BGE VELC E MTRX HK AND LOOP

## (undated) DEVICE — PADDING,UNDERCAST,COTTON, 4"X4YD STERILE: Brand: MEDLINE

## (undated) DEVICE — INTENDED FOR TISSUE SEPARATION, AND OTHER PROCEDURES THAT REQUIRE A SHARP SURGICAL BLADE TO PUNCTURE OR CUT.: Brand: BARD-PARKER ® CARBON RIB-BACK BLADES

## (undated) DEVICE — FORCEPS BX L240CM JAW DIA2.4MM ORNG L CAP W/ NDL DISP RAD

## (undated) DEVICE — PADDING CAST W6INXL4YD COT LO LINTING WYTEX

## (undated) DEVICE — CANNULA ORAL NSL AD CO2 N INTUB O2 DEL DISP TRU LNK

## (undated) DEVICE — ACUSNARE POLYPECTOMY SNARE: Brand: ACUSNARE

## (undated) DEVICE — SOLUTION IV IRRIG POUR BRL 0.9% SODIUM CHL 2F7124

## (undated) DEVICE — TUBING SUCT NON-STRL 9/32X100 W/CNNT